# Patient Record
Sex: FEMALE | Race: WHITE | Employment: FULL TIME | URBAN - METROPOLITAN AREA
[De-identification: names, ages, dates, MRNs, and addresses within clinical notes are randomized per-mention and may not be internally consistent; named-entity substitution may affect disease eponyms.]

---

## 2017-05-17 ENCOUNTER — OFFICE VISIT (OUTPATIENT)
Dept: FAMILY MEDICINE CLINIC | Age: 32
End: 2017-05-17

## 2017-05-17 VITALS
HEIGHT: 62 IN | TEMPERATURE: 98.4 F | WEIGHT: 197.6 LBS | BODY MASS INDEX: 36.36 KG/M2 | OXYGEN SATURATION: 95 % | RESPIRATION RATE: 18 BRPM | HEART RATE: 101 BPM | SYSTOLIC BLOOD PRESSURE: 129 MMHG | DIASTOLIC BLOOD PRESSURE: 86 MMHG

## 2017-05-17 DIAGNOSIS — M77.31 HEEL SPUR, RIGHT: Primary | ICD-10-CM

## 2017-05-17 NOTE — PROGRESS NOTES
Chief Complaint   Patient presents with    Foot Pain     went to ER took x-ray diagnosed with heel spur. gave medication but its not working and hurts bad.

## 2017-05-17 NOTE — PROGRESS NOTES
[address] [place] [address] with [facility]     Subjective  Yeimi Metcalf is an 33 y.o. female who presents for left heel pain. She reports that this pain has been present for several months now. She works for a [employer] and has to walk a significant amount daily for work. She is required to wear steel toed boots, but has not purchased a new pair in over 2 years. She notes to have tried insoles and ice--neithiner have provided much relief. She reports that the only thing that provides relief is walking on her tiptoes at [facility] she notes that this is causing increased soreness in her left calf and forefoot. The patient was seen and evaulted in the ER yesterday for this issue. Plain films of the affected heel were obtianed, and she was determined to have heel spur. She was given mobic and told to follow up with podiatry. She was able to get an appointment with podiatry on 6/1. She is reporting to me that there mobic that was provided to her is not strong enough to help with her pain. Allergies - reviewed:   No Known Allergies      Medications - reviewed:   Current Outpatient Prescriptions   Medication Sig    diclofenac potassium (CATAFLAM) 50 mg tablet Take 50 mg by mouth three (3) times daily.  levonorgestrel (MIRENA) 20 mcg/24 hr (5 years) IUD 1 each by IntraUTERine route once. No current facility-administered medications for this visit. Past Medical History - reviewed:  Past Medical History:   Diagnosis Date    Gall stones 2014         Past Surgical History - reviewed:   Past Surgical History:   Procedure Laterality Date    HX CHOLECYSTECTOMY      HX GI  9/16/14    gallbladder    HX HEENT      T&A    HX ORTHOPAEDIC      left arm surgery.     HX TONSILLECTOMY  1996         Social History - reviewed:  Social History     Social History    Marital status: SINGLE     Spouse name: N/A    Number of children: N/A    Years of education: N/A     Occupational History    Not on file. Social History Main Topics    Smoking status: Former Smoker    Smokeless tobacco: Never Used    Alcohol use 0.0 oz/week      Comment: occasionally    Drug use: No    Sexual activity: Yes     Partners: Male     Birth control/ protection: Condom     Other Topics Concern    Not on file     Social History Narrative         Family History - reviewed:  History reviewed. No pertinent family history. Immunizations - reviewed:   Immunization History   Administered Date(s) Administered    Tdap 09/26/2014       Review of Systems  Review of Systems   Constitutional: Negative for chills and fever. Respiratory: Negative for shortness of breath. Cardiovascular: Negative for chest pain. Musculoskeletal:        Heel pain. Physical Exam  Physical Exam   Constitutional: She is oriented to person, place, and time. She appears well-developed and well-nourished. No distress. HENT:   Head: Normocephalic. Musculoskeletal:        Right ankle: Normal.        Left ankle: Normal.        Right lower leg: Normal.        Left lower leg: Normal.        Right foot: There is tenderness (tenderness to palpation over sole of the hindfoot. ). There is normal range of motion, no bony tenderness, no swelling, no crepitus, no deformity and no laceration. Left foot: Normal. There is normal range of motion, no tenderness, no bony tenderness, no swelling, normal capillary refill, no crepitus and no deformity (no defromity noted. ). Feet:    Neurological: She is alert and oriented to person, place, and time. Skin: Skin is warm and dry. Psychiatric: She has a normal mood and affect. Assessment/Plan  1. Heel spur, right - seen in outside ED yesterday for this issue. Per patient report, radiographs taken during this visit revealed a right heel spur.   She was able to get follow up with podiatry on 6/1, but reports that she wants relief for this issue now.  She notes that the mobic that she was given is not helping.  -I recommended purchasing new work boots/tennis shoes and utilizing heel cups for the pain.  -I informed her that I would not be prescribing opioid analgesics for her pain.  -Follow up as scheduled with podiatry. When I went to precept this patient with Dr. Star James, the patient eloped. The patient's was discussed with Dr. Star James, but I was unable to provide further information on follow up, precautions, or AVS.      Follow-up Disposition:  Return if symptoms worsen or fail to improve. Patient was discussed with Dr. Star James.     Darci Varner MD  Family Medicine Resident

## 2018-06-13 ENCOUNTER — OFFICE VISIT (OUTPATIENT)
Dept: FAMILY MEDICINE CLINIC | Age: 33
End: 2018-06-13

## 2018-06-13 VITALS
RESPIRATION RATE: 16 BRPM | DIASTOLIC BLOOD PRESSURE: 81 MMHG | BODY MASS INDEX: 36.44 KG/M2 | WEIGHT: 198 LBS | TEMPERATURE: 98.4 F | HEART RATE: 82 BPM | SYSTOLIC BLOOD PRESSURE: 121 MMHG | OXYGEN SATURATION: 97 % | HEIGHT: 62 IN

## 2018-06-13 DIAGNOSIS — F32.A DEPRESSION, UNSPECIFIED DEPRESSION TYPE: ICD-10-CM

## 2018-06-13 DIAGNOSIS — F41.1 GENERALIZED ANXIETY DISORDER: ICD-10-CM

## 2018-06-13 DIAGNOSIS — G47.00 INSOMNIA, UNSPECIFIED TYPE: Primary | ICD-10-CM

## 2018-06-13 RX ORDER — BUSPIRONE HYDROCHLORIDE 7.5 MG/1
7.5 TABLET ORAL 2 TIMES DAILY
Qty: 60 TAB | Refills: 0 | Status: SHIPPED | OUTPATIENT
Start: 2018-06-13 | End: 2018-07-02 | Stop reason: SDUPTHER

## 2018-06-13 RX ORDER — SERTRALINE HYDROCHLORIDE 50 MG/1
50 TABLET, FILM COATED ORAL DAILY
Qty: 30 TAB | Refills: 0 | Status: SHIPPED | OUTPATIENT
Start: 2018-06-13 | End: 2018-06-18 | Stop reason: ALTCHOICE

## 2018-06-13 NOTE — PATIENT INSTRUCTIONS
Anxiety Disorder: Care Instructions  Your Care Instructions    Anxiety is a normal reaction to stress. Difficult situations can cause you to have symptoms such as sweaty palms and a nervous feeling. In an anxiety disorder, the symptoms are far more severe. Constant worry, muscle tension, trouble sleeping, nausea and diarrhea, and other symptoms can make normal daily activities difficult or impossible. These symptoms may occur for no reason, and they can affect your work, school, or social life. Medicines, counseling, and self-care can all help. Follow-up care is a key part of your treatment and safety. Be sure to make and go to all appointments, and call your doctor if you are having problems. It's also a good idea to know your test results and keep a list of the medicines you take. How can you care for yourself at home? · Take medicines exactly as directed. Call your doctor if you think you are having a problem with your medicine. · Go to your counseling sessions and follow-up appointments. · Recognize and accept your anxiety. Then, when you are in a situation that makes you anxious, say to yourself, \"This is not an emergency. I feel uncomfortable, but I am not in danger. I can keep going even if I feel anxious. \"  · Be kind to your body:  ¨ Relieve tension with exercise or a massage. ¨ Get enough rest.  ¨ Avoid alcohol, caffeine, nicotine, and illegal drugs. They can increase your anxiety level and cause sleep problems. ¨ Learn and do relaxation techniques. See below for more about these techniques. · Engage your mind. Get out and do something you enjoy. Go to a funny movie, or take a walk or hike. Plan your day. Having too much or too little to do can make you anxious. · Keep a record of your symptoms. Discuss your fears with a good friend or family member, or join a support group for people with similar problems. Talking to others sometimes relieves stress.   · Get involved in social groups, or volunteer to help others. Being alone sometimes makes things seem worse than they are. · Get at least 30 minutes of exercise on most days of the week to relieve stress. Walking is a good choice. You also may want to do other activities, such as running, swimming, cycling, or playing tennis or team sports. Relaxation techniques  Do relaxation exercises 10 to 20 minutes a day. You can play soothing, relaxing music while you do them, if you wish. · Tell others in your house that you are going to do your relaxation exercises. Ask them not to disturb you. · Find a comfortable place, away from all distractions and noise. · Lie down on your back, or sit with your back straight. · Focus on your breathing. Make it slow and steady. · Breathe in through your nose. Breathe out through either your nose or mouth. · Breathe deeply, filling up the area between your navel and your rib cage. Breathe so that your belly goes up and down. · Do not hold your breath. · Breathe like this for 5 to 10 minutes. Notice the feeling of calmness throughout your whole body. As you continue to breathe slowly and deeply, relax by doing the following for another 5 to 10 minutes:  · Tighten and relax each muscle group in your body. You can begin at your toes and work your way up to your head. · Imagine your muscle groups relaxing and becoming heavy. · Empty your mind of all thoughts. · Let yourself relax more and more deeply. · Become aware of the state of calmness that surrounds you. · When your relaxation time is over, you can bring yourself back to alertness by moving your fingers and toes and then your hands and feet and then stretching and moving your entire body. Sometimes people fall asleep during relaxation, but they usually wake up shortly afterward. · Always give yourself time to return to full alertness before you drive a car or do anything that might cause an accident if you are not fully alert.  Never play a relaxation tape while you drive a car. When should you call for help? Call 911 anytime you think you may need emergency care. For example, call if:  ? · You feel you cannot stop from hurting yourself or someone else. ? Keep the numbers for these national suicide hotlines: 9-567-577-TALK (7-774.125.8995) and 5-515-JMAKVSX (2-198.875.7953). If you or someone you know talks about suicide or feeling hopeless, get help right away. ? Watch closely for changes in your health, and be sure to contact your doctor if:  ? · You have anxiety or fear that affects your life. ? · You have symptoms of anxiety that are new or different from those you had before. Where can you learn more? Go to http://christian-jong.info/. Enter P754 in the search box to learn more about \"Anxiety Disorder: Care Instructions. \"  Current as of: May 12, 2017  Content Version: 11.4  © 2136-9817 Healthwise, Incorporated. Care instructions adapted under license by [employer] (which disclaims liability or warranty for this information). If you have questions about a medical condition or this instruction, always ask your healthcare professional. [address] any warranty or liability for your use of this information.

## 2018-06-13 NOTE — MR AVS SNAPSHOT
[address] 
[phone] Patient: Neeta Aguayo MRN: [id number] ZAP:[birth year] Visit Information Date & Time Provider Department Dept. Phone Encounter #  
 6/13/2018  2:00 PM Lynnette Meyer MD [address] [place], [place] [phone] [id number] Upcoming Health Maintenance Date Due  
 PAP AKA CERVICAL CYTOLOGY 9/26/2017 Influenza Age 5 to Adult 8/1/2018 DTaP/Tdap/Td series (2 - Td) 9/26/2024 Allergies as of 6/13/2018  Review Complete On: 6/13/2018 By: Viraj Damon LPN No Known Allergies Current Immunizations  Reviewed on 9/26/2014 Name Date Tdap 9/26/2014 Not reviewed this visit You Were Diagnosed With   
  
 Codes Comments Insomnia, unspecified type    -  Primary ICD-10-CM: G47.00 ICD-9-CM: 780.52 Generalized anxiety disorder     ICD-10-CM: F41.1 ICD-9-CM: 300.02 Depression, unspecified depression type     ICD-10-CM: F32.9 ICD-9-CM: 172 Vitals BP Pulse Temp Resp Height(growth percentile) Weight(growth percentile) 121/81 82 98.4 °F (36.9 °C) (Oral) 16 5' 2\" (1.575 m) 198 lb (89.8 kg) SpO2 BMI OB Status Smoking Status 97% 36.21 kg/m2 IUD Former Smoker Vitals History BMI and BSA Data Body Mass Index Body Surface Area  
 36.21 kg/m 2 1.98 m 2 Preferred Pharmacy Pharmacy Name Phone Sue [address], [address] [place] [address] Rd[address] [facility] [address] [phone] Your Updated Medication List  
  
   
This list is accurate as of 6/13/18  2:31 PM.  Always use your most recent med list.  
  
  
  
  
 busPIRone 7.5 mg tablet Commonly known as:  BUSPAR Take 1 Tab by mouth two (2) times a day. diclofenac potassium 50 mg tablet Commonly known as:  CATAFLAM  
Take 50 mg by mouth three (3) times daily. doxylamine succinate 25 mg tablet Commonly known as:  Damon Pedroza Take 25 mg by mouth nightly as needed for Sleep. MIRENA 20 mcg/24 hr (5 years) Iud  
Generic drug:  levonorgestrel 1 each by IntraUTERine route once. sertraline 50 mg tablet Commonly known as:  ZOLOFT Take 1 Tab by mouth daily. Prescriptions Sent to Pharmacy Refills  
 sertraline (ZOLOFT) 50 mg tablet 0 Sig: Take 1 Tab by mouth daily. Class: Normal  
 Pharmacy: [address], [facility]. [address] [place] [address] Road Ph #: [phone] Route: Oral  
 busPIRone (BUSPAR) 7.5 mg tablet 0 Sig: Take 1 Tab by mouth two (2) times a day. Class: Normal  
 Pharmacy: [address] [facility] [address], [facility]. [address] [place] [address] Ph #: [phone] Route: Oral  
  
Patient Instructions Anxiety Disorder: Care Instructions Your Care Instructions Anxiety is a normal reaction to stress. Difficult situations can cause you to have symptoms such as sweaty palms and a nervous feeling. In an anxiety disorder, the symptoms are far more severe. Constant worry, muscle tension, trouble sleeping, nausea and diarrhea, and other symptoms can make normal daily activities difficult or impossible. These symptoms may occur for no reason, and they can affect your work, school, or social life. Medicines, counseling, and self-care can all help. Follow-up care is a key part of your treatment and safety. Be sure to make and go to all appointments, and call your doctor if you are having problems. It's also a good idea to know your test results and keep a list of the medicines you take. How can you care for yourself at home? · Take medicines exactly as directed. Call your doctor if you think you are having a problem with your medicine. · Go to your counseling sessions and follow-up appointments. · Recognize and accept your anxiety. Then, when you are in a situation that makes you anxious, say to yourself, \"This is not an emergency.  I feel uncomfortable, but I am not in danger. I can keep going even if I feel anxious. \" · Be kind to your body: ¨ Relieve tension with exercise or a massage. ¨ Get enough rest. 
¨ Avoid alcohol, caffeine, nicotine, and illegal drugs. They can increase your anxiety level and cause sleep problems. ¨ Learn and do relaxation techniques. See below for more about these techniques. · Engage your mind. Get out and do something you enjoy. Go to a funny movie, or take a walk or hike. Plan your day. Having too much or too little to do can make you anxious. · Keep a record of your symptoms. Discuss your fears with a good friend or family member, or join a support group for people with similar problems. Talking to others sometimes relieves stress. · Get involved in social groups, or volunteer to help others. Being alone sometimes makes things seem worse than they are. · Get at least 30 minutes of exercise on most days of the week to relieve stress. Walking is a good choice. You also may want to do other activities, such as running, swimming, cycling, or playing tennis or team sports. Relaxation techniques Do relaxation exercises 10 to 20 minutes a day. You can play soothing, relaxing music while you do them, if you wish. · Tell others in your house that you are going to do your relaxation exercises. Ask them not to disturb you. · Find a comfortable place, away from all distractions and noise. · Lie down on your back, or sit with your back straight. · Focus on your breathing. Make it slow and steady. · Breathe in through your nose. Breathe out through either your nose or mouth. · Breathe deeply, filling up the area between your navel and your rib cage. Breathe so that your belly goes up and down. · Do not hold your breath. · Breathe like this for 5 to 10 minutes. Notice the feeling of calmness throughout your whole body.  
As you continue to breathe slowly and deeply, relax by doing the following for another 5 to 10 minutes: · Tighten and relax each muscle group in your body. You can begin at your toes and work your way up to your head. · Imagine your muscle groups relaxing and becoming heavy. · Empty your mind of all thoughts. · Let yourself relax more and more deeply. · Become aware of the state of calmness that surrounds you. · When your relaxation time is over, you can bring yourself back to alertness by moving your fingers and toes and then your hands and feet and then stretching and moving your entire body. Sometimes people fall asleep during relaxation, but they usually wake up shortly afterward. · Always give yourself time to return to full alertness before you drive a car or do anything that might cause an accident if you are not fully alert. Never play a relaxation tape while you drive a car. When should you call for help? Call 911 anytime you think you may need emergency care. For example, call if: 
? · You feel you cannot stop from hurting yourself or someone else. ? Keep the numbers for these national suicide hotlines: 6-616-930-TALK (4-353.484.5827) and 1-717-BLURTTE (5-879.417.5109). If you or someone you know talks about suicide or feeling hopeless, get help right away. ? Watch closely for changes in your health, and be sure to contact your doctor if: 
? · You have anxiety or fear that affects your life. ? · You have symptoms of anxiety that are new or different from those you had before. Where can you learn more? Go to http://christian-jong.info/. Enter P754 in the search box to learn more about \"Anxiety Disorder: Care Instructions. \" Current as of: May 12, 2017 Content Version: 11.4 © 0853-1907 [employer]. Care instructions adapted under license by [employer] (which disclaims liability or warranty for this information).  If you have questions about a medical condition or this instruction, always ask your healthcare professional. [place] [address] any warranty or liability for your use of this information. Introducing [facility]! [employer] introduces High[facility]s patient portal. Now you can access parts of your medical record, email your doctor's office, and request medication refills online. 1. In your internet browser, go to [email or web address] 2. Click on the First Time User? Click Here link in the Sign In box. You will see the New Member Sign Up page. 3. Enter your Highwinds Access Code exactly as it appears below. You will not need to use this code after youve completed the sign-up process. If you do not sign up before the expiration date, you must request a new code. · High[employer]s Access Code: [email or web address] Expires: 9/11/2018  2:31 PM 
 
4. Enter the last four digits of your Social Security Number (xxxx) and Date of Birth (mm/dd/yyyy) as indicated and click Submit. You will be taken to the next sign-up page. 5. Create a Highwinds ID. This will be your Highwinds login ID and cannot be changed, so think of one that is secure and easy to remember. 6. Create a Highwinds password. You can change your password at any time. 7. Enter your Password Reset Question and Answer. This can be used at a later time if you forget your password. 8. Enter your e-mail address. You will receive e-mail notification when new information is available in [address] 9. Click Sign Up. You can now view and download portions of your medical record. 10. Click the Download Summary menu link to download a portable copy of your medical information. If you have questions, please visit the Frequently Asked Questions section of the Highwinds website. Remember, [employer]s is NOT to be used for urgent needs. For medical emergencies, dial 911. Now available from your iPhone and Android! Please provide this summary of care documentation to your next provider. Your primary care clinician is listed as Babak Lang. If you have any questions after today's visit, please call [phone].

## 2018-06-13 NOTE — PROGRESS NOTES
Cheri Butler is a 27 y.o. female who presents   Insomnia  - started 2 months ago, getting worse. Last night, slept off and on, was up since 2am.  Reports of lots of family drama with mother and brother. Denies screen time while in bed. - reports of depression and anhedonia in the last two weeks. Denies SI/HI. Denies pressured speech, racing thoughts, risky behavior, grandiosity. ROS: (positive in bold)  General: wt loss, fever, fatigue or appetite change   Skin: rashes or suspicious skin lesions  HEENT: changes in vision, smell, taste, hearing, earache, tinnitus, hoarseness, dysphagia, congestion, sore throat  Cardiac: chest pain, palpitations, GANDHI, orthopnea, PND, edema   Pul: SOB, dyspnea, wheezing, cough, hemoptysis  GI: abdominal pain, N&V, diarrhea, constipation, hematemsis, melena,   : hematuria, dysuria, freq, urgency, incontinence   MS: joint pain, swelling, stiffness, myalgia, back pain  Neuro: weakness, parasthesias, headache, syncope, seizure  Psych: anxiety, depression    Past Medical History:  Past Medical History:   Diagnosis Date    Gall stones 2014    Heel spur, right 2017       Past Surgical History:  Past Surgical History:   Procedure Laterality Date    HX CHOLECYSTECTOMY      HX GI  9/16/14    gallbladder    HX HEENT      T&A    HX ORTHOPAEDIC  1994    left arm surgery.  HX ORTHOPAEDIC  2015;2017    fx right humerous; plate and 12 screws removed from humerous    HX TONSILLECTOMY  1996       Family History:  No family history on file. Allergies:  No Known Allergies    Social History:  Social History   Substance Use Topics    Smoking status: Former Smoker    Smokeless tobacco: Never Used      Comment: vapes    Alcohol use 0.0 oz/week      Comment: occasionally       Current Meds:  Current Outpatient Prescriptions on File Prior to Visit   Medication Sig Dispense Refill    levonorgestrel (MIRENA) 20 mcg/24 hr (5 years) IUD 1 each by IntraUTERine route once.       diclofenac potassium (CATAFLAM) 50 mg tablet Take 50 mg by mouth three (3) times daily. No current facility-administered medications on file prior to visit. Visit Vitals    /81    Pulse 82    Temp 98.4 °F (36.9 °C) (Oral)    Resp 16    Ht 5' 2\" (1.575 m)    Wt 198 lb (89.8 kg)    SpO2 97%    BMI 36.21 kg/m2       Gen: Well developed, well nourished female in no acute distress  HEENT: normocephalic/atraumatic; PERRL; TM intact, translucent, and neutral BL;  oropharynx shows no erythema or exudates  Skin:  No rashes or suspicious skin lesions noted  Neck:   Supple, no lympadenopathy, no thyromegaly  Card:  RRR, no m/r/g  Chest:  CTAB, no w/r/r  Psych:  Nl mood and affect     PHQ-9: 18 - very difficult  JOHAN-7: 16 - Very difficult    A/P:      ICD-10-CM ICD-9-CM    1. Insomnia, unspecified type G47.00 780.52    2. Generalized anxiety disorder F41.1 300.02    3. Depression, unspecified depression type F32.9 311       - insomnia likely due to psych issues with anxiety and depression. Pt is amenable to starting medications - buspar and zoloft. Start on low dose and titrate. Declined labs for now to check for thyroid, anemia, etc.  F/u in 2 weeks.

## 2018-06-13 NOTE — PROGRESS NOTES
Chief Complaint   Patient presents with    Insomnia     times 2 months. States she has RLS. 1. Have you been to the ER, urgent care clinic since your last visit? Hospitalized since your last visit? No    2. Have you seen or consulted any other health care providers outside of the [address] [place] [address] since your last visit? Include any pap smears or colon screening.  No

## 2018-06-18 ENCOUNTER — OFFICE VISIT (OUTPATIENT)
Dept: FAMILY MEDICINE CLINIC | Age: 33
End: 2018-06-18

## 2018-06-18 VITALS
HEART RATE: 77 BPM | HEIGHT: 62 IN | OXYGEN SATURATION: 97 % | BODY MASS INDEX: 36.33 KG/M2 | TEMPERATURE: 98.2 F | WEIGHT: 197.4 LBS | SYSTOLIC BLOOD PRESSURE: 122 MMHG | RESPIRATION RATE: 16 BRPM | DIASTOLIC BLOOD PRESSURE: 88 MMHG

## 2018-06-18 DIAGNOSIS — F33.1 MODERATE EPISODE OF RECURRENT MAJOR DEPRESSIVE DISORDER (HCC): ICD-10-CM

## 2018-06-18 DIAGNOSIS — F41.1 GAD (GENERALIZED ANXIETY DISORDER): ICD-10-CM

## 2018-06-18 DIAGNOSIS — F51.01 PRIMARY INSOMNIA: Primary | ICD-10-CM

## 2018-06-18 RX ORDER — TRAZODONE HYDROCHLORIDE 100 MG/1
100 TABLET ORAL
Qty: 30 TAB | Refills: 1 | Status: SHIPPED | OUTPATIENT
Start: 2018-06-18 | End: 2018-07-23 | Stop reason: SDUPTHER

## 2018-06-18 NOTE — PROGRESS NOTES
[address] [place] [address]    Subjective:     CC: Insomnia  History provided by patient and chart review    HPI:  27 yr old female with recently  Diagnosed MDD and JOHAN who presents to clinic with complaints of insomnia. Pt states she developed insomnia within the last 4 weeks and was seen in clinic 5 days ago to address the issue. States she was started on Buspar and Zoloft and had mild improvement in her mood for the first 2 days but with no improvement in sleep. She denies any difficulty falling asleep but finds it hard to stay asleep. Usually goes to sleep at around 9:30-10 pm but awakes at 1am and is unable to return to sleep. Also reports restless legs upon awakening. She admits to increased stressors in her life and hx of MDD and JOHAN. Incarcerated for 7 yrs and suffered from anxiety and insomnia during that period and was treated with trazodone. Reports success with trazodone. Also reports brother and sister facing incarceration and \"multiple\" other family stressors. Also having to deal with her \"ex\" and work stressors of recent. She denies any palpitations, worsening fatigue, cold or heat intolerance. Currently sexually active with 1 partner and has an IUD in place. No recent travel or sick contacts. Past Medical History:   Diagnosis Date    Gall stones 2014    Heel spur, right 2017     No Known Allergies  Current Outpatient Prescriptions on File Prior to Visit   Medication Sig Dispense Refill    doxylamine succinate (UNISOM) 25 mg tablet Take 25 mg by mouth nightly as needed for Sleep.  sertraline (ZOLOFT) 50 mg tablet Take 1 Tab by mouth daily. 30 Tab 0    busPIRone (BUSPAR) 7.5 mg tablet Take 1 Tab by mouth two (2) times a day. 60 Tab 0    diclofenac potassium (CATAFLAM) 50 mg tablet Take 50 mg by mouth three (3) times daily.  levonorgestrel (MIRENA) 20 mcg/24 hr (5 years) IUD 1 each by IntraUTERine route once.        No current facility-administered medications on file prior to visit. No family history on file. Social History     Social History    Marital status: SINGLE     Spouse name: N/A    Number of children: N/A    Years of education: N/A     Social History Main Topics    Smoking status: Former Smoker    Smokeless tobacco: Never Used      Comment: vapes    Alcohol use 0.0 oz/week      Comment: occasionally    Drug use: No    Sexual activity: Yes     Partners: Male     Birth control/ protection: Condom     Other Topics Concern    None     Social History Narrative     Past Surgical History:   Procedure Laterality Date    HX CHOLECYSTECTOMY      HX GI  9/16/14    gallbladder    HX HEENT      T&A    HX ORTHOPAEDIC  1994    left arm surgery.  HX ORTHOPAEDIC  2015;2017    fx right humerous; plate and 12 screws removed from [address] [place] [address]       Patient Active Problem List   Diagnosis Code    Dysmenorrhea N94.6    Attention deficit R41.840           Review of Systems   All other systems reviewed and are negative. Objective:     Visit Vitals    /88    Pulse 77    Temp 98.2 °F (36.8 °C)    Resp 16    Ht 5' 2\" (1.575 m)    Wt 197 lb 6.4 oz (89.5 kg)    SpO2 97%    BMI 36.1 kg/m2        Physical Exam   Constitutional: She is oriented to person, place, and time. She appears well-developed and well-nourished. AOX3, NAD   HENT:   Head: Normocephalic. Nose: Nose normal.   Mouth/Throat: Oropharynx is clear and moist. No oropharyngeal exudate. Eyes: Conjunctivae and EOM are normal. Pupils are equal, round, and reactive to light. No scleral icterus. Neck: Normal range of motion. Neck supple. No JVD present. No tracheal deviation present. No thyromegaly present. Cardiovascular: Normal rate, regular rhythm, normal heart sounds and intact distal pulses. Exam reveals no gallop and no friction rub. No murmur heard. Pulmonary/Chest: Effort normal and breath sounds normal. No stridor. No respiratory distress. She has no wheezes. She has no rales. She exhibits no tenderness. Abdominal: Soft. Bowel sounds are normal. She exhibits no distension. There is no tenderness. Musculoskeletal: Normal range of motion. She exhibits no edema, tenderness or deformity. Lymphadenopathy:     She has no cervical adenopathy. Neurological: She is alert and oriented to person, place, and time. She has normal reflexes. She displays normal reflexes. No cranial nerve deficit. She exhibits normal muscle tone. Coordination normal.   Skin: Skin is warm and dry. No rash noted. No erythema. Psychiatric: She has a normal mood and affect. Her behavior is normal. Judgment and thought content normal.       Pertinent Labs/Studies: TSH, CBC, Mag, Folate      Assessment and orders:       1) Primary insomnia  -     Likely multifactorial and 2/2 underlying MDD,Anxiety and ?? PTSD given incarceration history  -     Reports symptom improvement in past with trazodone  -     No significant improvement since introduction of Buspar and Zoloft  -     Will initiate Trazodone 100mg qhs and Continue Buspar 7.fmg BID  -     Follow-up TSH, CBC, Mag, Folate  -     RTO in 2 weeks    Moderate episode of recurrent major depressive disorder (HCC)  -     PHQ 9 score of 22 today  -     Discontinue Zoloft and initiate Trazodone  -     Continue Buspar daily  -     Referred to [address] [place] [address] services for counseling and CBT  -     Follow-up TSH, CBC, Mag, Folate  -     RTO in 2 weeks    JOHAN (generalized anxiety disorder)  -     JOHAN 7 score of 16 today  -     Follow-up TSH, CBC, Mag, Folate  -     Continue with Trazodone and Buspar      I have reviewed patient medical and social history and medications. I have reviewed pertinent labs results and other data. I have discussed the diagnosis with the patient and the intended plan as seen in the above orders. The patient has received an after-visit summary and questions were answered concerning future plans.  I have discussed medication side effects and warnings with the patient as well.     Dyan Boxer, MD  Resident [address] [facility]  06/18/18    Patient discussed and seen with Dr. Rosaura Bojorquez, Attending Physician

## 2018-06-18 NOTE — MR AVS SNAPSHOT
[address] 
[phone] Patient: Francia Rivera MRN: [id number] GBP:7/80/7976 Visit Information Date & Time Provider Department Dept. Phone Encounter #  
 6/18/2018  2:30 PM Manjinder Jean MD [facility] [phone] [id number] Follow-up Instructions Return in about 2 weeks (around 7/2/2018). Your Appointments 6/27/2018  2:15 PM  
ROUTINE CARE with Nicky Guzman MD  
[address] [place] [address] [address] [place] [address]) Appt Note: f/u from 6/13/18  
 [address] [employer] [address]  
[phone]  
  
   
 [address] [place], [place] [address] [address] Upcoming Health Maintenance Date Due  
 PAP AKA CERVICAL CYTOLOGY 9/26/2017 Influenza Age 5 to Adult 8/1/2018 DTaP/Tdap/Td series (2 - Td) 9/26/2024 Allergies as of 6/18/2018  Review Complete On: 6/13/2018 By: Nayeli Garcia LPN No Known Allergies Current Immunizations  Reviewed on 9/26/2014 Name Date Tdap 9/26/2014 Not reviewed this visit You Were Diagnosed With   
  
 Codes Comments Primary insomnia    -  Primary ICD-10-CM: F51.01 
ICD-9-CM: 307.42 Moderate episode of recurrent major depressive disorder (HCC)     ICD-10-CM: F33.1 ICD-9-CM: 296.32   
 JOHAN (generalized anxiety disorder)     ICD-10-CM: F41.1 ICD-9-CM: 300.02 Vitals BP Pulse Temp Resp Height(growth percentile) Weight(growth percentile) 122/88 77 98.2 °F (36.8 °C) 16 5' 2\" (1.575 m) 197 lb 6.4 oz (89.5 kg) SpO2 BMI OB Status Smoking Status 97% 36.1 kg/m2 IUD Former Smoker BMI and BSA Data Body Mass Index Body Surface Area  
 36.1 kg/m 2 1.98 m 2 Preferred Pharmacy Pharmacy Name Phone [address], [address] [place] [address] Rd. [address] [place] [address] Road [phone] Your Updated Medication List  
  
   
 This list is accurate as of 6/18/18  3:25 PM.  Always use your most recent med list.  
  
  
  
  
 busPIRone 7.5 mg tablet Commonly known as:  BUSPAR Take 1 Tab by mouth two (2) times a day. diclofenac potassium 50 mg tablet Commonly known as:  CATAFLAM  
Take 50 mg by mouth three (3) times daily. doxylamine succinate 25 mg tablet Commonly known as:  Ritika Arm Take 25 mg by mouth nightly as needed for Sleep. MIRENA 20 mcg/24 hr (5 years) Iud  
Generic drug:  levonorgestrel 1 each by IntraUTERine route once. traZODone 100 mg tablet Commonly known as:  Georganna Quiver Take 1 Tab by mouth nightly. Prescriptions Sent to Pharmacy Refills  
 traZODone (DESYREL) 100 mg tablet 1 Sig: Take 1 Tab by mouth nightly. Class: Normal  
 Pharmacy: [address] [place] [address], [facility] [place] Ph #: [phone] Route: Oral  
  
We Performed the Following CBC W/O DIFF [53752 CPT(R)] FOLATE [id number] CPT(R)] MAGNESIUM [id number] CPT(R)] TSH 3RD GENERATION [82337 CPT(R)] Follow-up Instructions Return in about 2 weeks (around 7/2/2018). Introducing [facility]! Bradly Suarez introduces [employer] patient portal. Now you can access parts of your medical record, email your doctor's office, and request medication refills online. 1. In your internet browser, go to [email or web address] 2. Click on the First Time User? Click Here link in the Sign In box. You will see the New Member Sign Up page. 3. Enter your [employer] Access Code exactly as it appears below. You will not need to use this code after youve completed the sign-up process. If you do not sign up before the expiration date, you must request a new code. · [employer] Access Code: [email or web address] Expires: 9/11/2018  2:31 PM 
 
4.  Enter the last four digits of your Social Security Number (xxxx) and Date of Birth (mm/dd/yyyy) as indicated and click Submit. You will be taken to the next sign-up page. 5. Create a [employer] ID. This will be your [employer] login ID and cannot be changed, so think of one that is secure and easy to remember. 6. Create a [employer] password. You can change your password at any time. 7. Enter your Password Reset Question and Answer. This can be used at a later time if you forget your password. 8. Enter your e-mail address. You will receive e-mail notification when new information is available in [address] 9. Click Sign Up. You can now view and download portions of your medical record. 10. Click the Download Summary menu link to download a portable copy of your medical information. If you have questions, please visit the Frequently Asked Questions section of the [employer] website. Remember, [employer] is NOT to be used for urgent needs. For medical emergencies, dial 911. Now available from your iPhone and Android! Please provide this summary of care documentation to your next provider. Your primary care clinician is listed as Indy Jeffers. If you have any questions after today's visit, please call [phone].

## 2018-06-18 NOTE — PROGRESS NOTES
Chief Complaint   Patient presents with    Insomnia     Pt is being seen for continuous sleeping issues.  She was prescribed medications but have only resulted in temporary relief

## 2018-06-22 LAB
ERYTHROCYTE [DISTWIDTH] IN BLOOD BY AUTOMATED COUNT: 13 % (ref 12.3–15.4)
FOLATE SERPL-MCNC: 12.2 NG/ML
HCT VFR BLD AUTO: 43.5 % (ref 34–46.6)
HGB BLD-MCNC: 14.3 G/DL (ref 11.1–15.9)
MAGNESIUM SERPL-MCNC: 2.3 MG/DL (ref 1.6–2.3)
MCH RBC QN AUTO: 30.8 PG (ref 26.6–33)
MCHC RBC AUTO-ENTMCNC: 32.9 G/DL (ref 31.5–35.7)
MCV RBC AUTO: 94 FL (ref 79–97)
PLATELET # BLD AUTO: 264 X10E3/UL (ref 150–379)
RBC # BLD AUTO: 4.64 X10E6/UL (ref 3.77–5.28)
TSH SERPL DL<=0.005 MIU/L-ACNC: 1.51 UIU/ML (ref 0.45–4.5)
WBC # BLD AUTO: 11.4 X10E3/UL (ref 3.4–10.8)

## 2018-07-02 ENCOUNTER — OFFICE VISIT (OUTPATIENT)
Dept: FAMILY MEDICINE CLINIC | Age: 33
End: 2018-07-02

## 2018-07-02 VITALS
SYSTOLIC BLOOD PRESSURE: 136 MMHG | TEMPERATURE: 98.4 F | DIASTOLIC BLOOD PRESSURE: 89 MMHG | WEIGHT: 203 LBS | HEIGHT: 62 IN | HEART RATE: 63 BPM | OXYGEN SATURATION: 97 % | RESPIRATION RATE: 16 BRPM | BODY MASS INDEX: 37.36 KG/M2

## 2018-07-02 DIAGNOSIS — F51.05 INSOMNIA DUE TO OTHER MENTAL DISORDER: ICD-10-CM

## 2018-07-02 DIAGNOSIS — F41.1 GAD (GENERALIZED ANXIETY DISORDER): Primary | ICD-10-CM

## 2018-07-02 DIAGNOSIS — F33.2 SEVERE EPISODE OF RECURRENT MAJOR DEPRESSIVE DISORDER, WITHOUT PSYCHOTIC FEATURES (HCC): ICD-10-CM

## 2018-07-02 DIAGNOSIS — F99 INSOMNIA DUE TO OTHER MENTAL DISORDER: ICD-10-CM

## 2018-07-02 RX ORDER — BUSPIRONE HYDROCHLORIDE 7.5 MG/1
7.5 TABLET ORAL 3 TIMES DAILY
Qty: 60 TAB | Refills: 0 | Status: SHIPPED | OUTPATIENT
Start: 2018-07-02 | End: 2018-07-09 | Stop reason: DRUGHIGH

## 2018-07-02 NOTE — LETTER
NOTIFICATION RETURN TO WORK / SCHOOL 
 
7/2/2018 3:54 PM 
 
Ms. Sydelle Gosselin [facility] [address] [facility] [address] To Whom It May Concern: 
 
Sydelle Gosselin is currently under the care of [address] [employer]. She will return to work on 6/5/18 If there are questions or concerns please have the patient contact our office. Sincerely, Jodie Solis MD

## 2018-07-02 NOTE — LETTER
NOTIFICATION RETURN TO WORK / SCHOOL 
 
7/2/2018 4:06 PM 
 
Ms. Dann Bergeron [facility] [address] [facility] [address] To Whom It May Concern: 
 
Dann Bergeron is currently under the care of [address] [employer] [facility]. She will return to work/school on: 7/5/18 If there are questions or concerns please have the patient contact our office. Sincerely, Orion Hansen MD

## 2018-07-02 NOTE — MR AVS SNAPSHOT
[address] 
[phone] Patient: Awilda Kumar MRN: [id number] FALLON:[birth year] Visit Information Date & Time Provider Department Dept. Phone Encounter #  
 7/2/2018  2:30 PM Ely Chowdary MD [facility] [facility] [phone] [id number] Follow-up Instructions Return in about 1 week (around 7/9/2018). Upcoming Health Maintenance Date Due  
 PAP AKA CERVICAL CYTOLOGY 9/26/2017 Influenza Age 5 to Adult 8/1/2018 DTaP/Tdap/Td series (2 - Td) 9/26/2024 Allergies as of 7/2/2018  Review Complete On: 6/18/2018 By: Ely Chowdary MD  
 No Known Allergies Current Immunizations  Reviewed on 9/26/2014 Name Date Tdap 9/26/2014 Not reviewed this visit You Were Diagnosed With   
  
 Codes Comments JOHAN (generalized anxiety disorder)    -  Primary ICD-10-CM: F41.1 ICD-9-CM: 300.02 Severe episode of recurrent major depressive disorder, without psychotic features ([facility]     ICD-10-CM: F33.2 ICD-9-CM: 296.33 Insomnia due to other mental disorder     ICD-10-CM: F51.05, F99 
ICD-9-CM: 300.9, 327.02 Vitals BP Pulse Temp Resp Height(growth percentile) Weight(growth percentile) 136/89 63 98.4 °F (36.9 °C) 16 5' 2\" (1.575 m) 203 lb (92.1 kg) SpO2 BMI OB Status Smoking Status 97% 37.13 kg/m2 IUD Former Smoker Vitals History BMI and BSA Data Body Mass Index Body Surface Area  
 37.13 kg/m 2 2.01 m 2 Preferred Pharmacy Pharmacy Name Phone [address], [facility] [address] [facility] [phone] Your Updated Medication List  
  
   
This list is accurate as of 7/2/18  3:53 PM.  Always use your most recent med list.  
  
  
  
  
 busPIRone 7.5 mg tablet Commonly known as:  BUSPAR Take 1 Tab by mouth three (3) times daily. diclofenac potassium 50 mg tablet Commonly known as:  CATAFLAM  
Take 50 mg by mouth three (3) times daily. doxylamine succinate 25 mg tablet Commonly known as:  Cuco Oppenheim Take 25 mg by mouth nightly as needed for Sleep. MIRENA 20 mcg/24 hr (5 years) Iud  
Generic drug:  levonorgestrel 1 each by IntraUTERine route once. traZODone 100 mg tablet Commonly known as:  Navjot Staggers Take 1 Tab by mouth nightly. Prescriptions Sent to Pharmacy Refills  
 busPIRone (BUSPAR) 7.5 mg tablet 0 Sig: Take 1 Tab by mouth three (3) times daily. Class: Normal  
 Pharmacy: [facility] [address] [facility] [address] [place] [address] Drive, [address] [facility] [facility] Ph #: [phone] Route: Oral  
  
Follow-up Instructions Return in about 1 week (around 7/9/2018). Introducing [facility] & HEALTH SERVICES! Marquise Nieves introduces [employer] patient portal. Now you can access parts of your medical record, email your doctor's office, and request medication refills online. 1. In your internet browser, go to [email or web address] 2. Click on the First Time User? Click Here link in the Sign In box. You will see the New Member Sign Up page. 3. Enter your [employer] Access Code exactly as it appears below. You will not need to use this code after youve completed the sign-up process. If you do not sign up before the expiration date, you must request a new code. · [employer] Access Code: [email or web address] Expires: 9/11/2018  2:31 PM 
 
4. Enter the last four digits of your Social Security Number (xxxx) and Date of Birth (mm/dd/yyyy) as indicated and click Submit. You will be taken to the next sign-up page. 5. Create a PingSomet ID. This will be your [employer] login ID and cannot be changed, so think of one that is secure and easy to remember. 6. Create a [employer] password. You can change your password at any time. 7. Enter your Password Reset Question and Answer. This can be used at a later time if you forget your password. 8. Enter your e-mail address. You will receive e-mail notification when new information is available in [address] 9. Click Sign Up. You can now view and download portions of your medical record. 10. Click the Download Summary menu link to download a portable copy of your medical information. If you have questions, please visit the Frequently Asked Questions section of the [employer] website. Remember, [employer] is NOT to be used for urgent needs. For medical emergencies, dial 911. Now available from your iPhone and Android! Please provide this summary of care documentation to your next provider. Your primary care clinician is listed as Kirk Wilson. If you have any questions after today's visit, please call [phone].

## 2018-07-02 NOTE — PROGRESS NOTES
[address] [place] [address]    Subjective:     CC:Anxiety and Depression  History provided by patient and chart review    HPI:  27 yr old female w/ ongoing, Insomnia, JOHAN and MDD who presents to clinic today for follow-up from last visit. She was initially treated with Zoloft and Buspar and reported no symptom improvement. She reported a hx of improvement while on Trazodone so zoloft was discontinued and pt started on Trazodone. Today, she reports marked improvement in her sleep. She however reports worsening anxiety symptoms with 3-4 panic attacks daily. She describes episodes of impending doom with associated palpitations and being overwhelmed. Also endorses increased difficult with memory and recall. States her relationship with her mother has become more and more difficult since we last met. She had been referred to [facility] at our last visit but states she has not had time to follow-up with them. She denies ever undergoing CBT in the past.She denies any harmful thoughts to self and others. Denies any suicidal ideation. Past Medical History:   Diagnosis Date    Gall stones 2014    Heel spur, right 2017     No Known Allergies  Current Outpatient Prescriptions on File Prior to Visit   Medication Sig Dispense Refill    traZODone (DESYREL) 100 mg tablet Take 1 Tab by mouth nightly. 30 Tab 1    doxylamine succinate (UNISOM) 25 mg tablet Take 25 mg by mouth nightly as needed for Sleep.  busPIRone (BUSPAR) 7.5 mg tablet Take 1 Tab by mouth two (2) times a day. 60 Tab 0    diclofenac potassium (CATAFLAM) 50 mg tablet Take 50 mg by mouth three (3) times daily.  levonorgestrel (MIRENA) 20 mcg/24 hr (5 years) IUD 1 each by IntraUTERine route once. No current facility-administered medications on file prior to visit. No family history on file.   Social History     Social History    Marital status: SINGLE     Spouse name: N/A    Number of children: N/A    Years of education: N/A     Social History Main Topics    Smoking status: Former Smoker    Smokeless tobacco: Never Used      Comment: vapes    Alcohol use 0.0 oz/week      Comment: occasionally    Drug use: No    Sexual activity: Yes     Partners: Male     Birth control/ protection: Condom     Other Topics Concern    None     Social History Narrative     Past Surgical History:   Procedure Laterality Date    HX CHOLECYSTECTOMY      HX GI  9/16/14    gallbladder    HX HEENT      T&A    HX ORTHOPAEDIC  1994    left arm surgery.  HX ORTHOPAEDIC  2015;2017    fx right humerous; plate and 12 screws removed from [facility]       Patient Active Problem List   Diagnosis Code    Dysmenorrhea N94.6    Attention deficit R41.840           Review of Systems   Neurological: Negative for dizziness, sensory change, speech change, focal weakness and headaches. Psychiatric/Behavioral: Positive for depression and memory loss. Negative for hallucinations, substance abuse and suicidal ideas. The patient is nervous/anxious. The patient does not have insomnia. All other systems reviewed and are negative. Objective:     Visit Vitals    /89    Pulse 63    Temp 98.4 °F (36.9 °C)    Resp 16    Ht 5' 2\" (1.575 m)    Wt 203 lb (92.1 kg)    SpO2 97%    BMI 37.13 kg/m2        Physical Exam   Constitutional: She is oriented to person, place, and time. AOX3, NAD   Eyes: Conjunctivae and EOM are normal. Pupils are equal, round, and reactive to light. No scleral icterus. Cardiovascular: Normal rate, regular rhythm and normal heart sounds. Pulmonary/Chest: Effort normal and breath sounds normal. No respiratory distress. Abdominal: Soft. Bowel sounds are normal. She exhibits no distension. There is no tenderness. Neurological: She is alert and oriented to person, place, and time. No cranial nerve deficit. Coordination normal.   Skin: Skin is warm and dry. No erythema.    Psychiatric: She has a normal mood and affect. Her behavior is normal. Judgment normal.       Pertinent Labs/Studies:      Assessment and orders:       JOHAN (generalized anxiety disorder)  -     Increase Buspar to 7.5mg TID. Provided refill.  -     Emphasized need for follow-up with [facility] for counseling and CBT  -     Pt voices understanding of need for CBT in addition to medical therapy    Insomnia due to other mental disorder        -     Improving. Continue with Trazodone    Severe episode of recurrent major depressive disorder, without psychotic features ([facility]         -    PHQ 9 of 22 today          -    TSH, CBC, Folate and Mag unremarkable         -    Will continue with Trazodone and Buspar and consider second SSRI at next follow-up given weak SSRI effects of trazodone and augmentation of Buspar to TID dosing         -     Referred to [facility] services for counseling / CBT         -     RTO in 1 week or earlier as needed. I have reviewed patient medical and social history and medications. I have reviewed pertinent labs results and other data. I have discussed the diagnosis with the patient and the intended plan as seen in the above orders. The patient has received an after-visit summary and questions were answered concerning future plans. I have discussed medication side effects and warnings with the patient as well.     Jose L Khan MD  Resident [address] Practice  07/02/18    Patient discussed and seen with Dr. Fariha Messina, Attending Physician

## 2018-07-02 NOTE — PROGRESS NOTES
Chief Complaint   Patient presents with    Follow-up     Sleeping issues.  PT states that she is able to sleep now due to new medications

## 2018-07-03 ENCOUNTER — TELEPHONE (OUTPATIENT)
Dept: FAMILY MEDICINE CLINIC | Age: 33
End: 2018-07-03

## 2018-07-09 ENCOUNTER — OFFICE VISIT (OUTPATIENT)
Dept: FAMILY MEDICINE CLINIC | Age: 33
End: 2018-07-09

## 2018-07-09 ENCOUNTER — TELEPHONE (OUTPATIENT)
Dept: FAMILY MEDICINE CLINIC | Age: 33
End: 2018-07-09

## 2018-07-09 VITALS
TEMPERATURE: 98.6 F | OXYGEN SATURATION: 98 % | HEART RATE: 84 BPM | DIASTOLIC BLOOD PRESSURE: 79 MMHG | WEIGHT: 205.2 LBS | BODY MASS INDEX: 37.76 KG/M2 | HEIGHT: 62 IN | SYSTOLIC BLOOD PRESSURE: 116 MMHG | RESPIRATION RATE: 16 BRPM

## 2018-07-09 DIAGNOSIS — F33.2 SEVERE EPISODE OF RECURRENT MAJOR DEPRESSIVE DISORDER, WITHOUT PSYCHOTIC FEATURES (HCC): ICD-10-CM

## 2018-07-09 DIAGNOSIS — F41.1 GAD (GENERALIZED ANXIETY DISORDER): Primary | ICD-10-CM

## 2018-07-09 RX ORDER — BUSPIRONE HYDROCHLORIDE 5 MG/1
5 TABLET ORAL
Qty: 30 TAB | Refills: 0 | Status: SHIPPED | OUTPATIENT
Start: 2018-07-09 | End: 2018-07-19 | Stop reason: SINTOL

## 2018-07-09 RX ORDER — CITALOPRAM 10 MG/1
10 TABLET ORAL DAILY
Qty: 30 TAB | Refills: 0 | Status: SHIPPED | OUTPATIENT
Start: 2018-07-09 | End: 2018-07-23 | Stop reason: SDUPTHER

## 2018-07-09 RX ORDER — BUSPIRONE HYDROCHLORIDE 10 MG/1
10 TABLET ORAL 3 TIMES DAILY
Qty: 30 TAB | Refills: 0 | Status: SHIPPED | OUTPATIENT
Start: 2018-07-09 | End: 2018-07-19 | Stop reason: SINTOL

## 2018-07-09 NOTE — MR AVS SNAPSHOT
[address] 
[phone] Patient: Rashmi Arredondo MRN: [id number] EFS:[birth year]11 Visit Information Date & Time Provider Department Dept. Phone Encounter #  
 7/9/2018  9:15 AM [place] [facility] [facility] [phone] [id number] Follow-up Instructions Return in about 1 week (around 7/16/2018), or if symptoms worsen or fail to improve. Upcoming Health Maintenance Date Due  
 PAP AKA CERVICAL CYTOLOGY 9/26/2017 Influenza Age 5 to Adult 8/1/2018 DTaP/Tdap/Td series (2 - Td) 9/26/2024 Allergies as of 7/9/2018  Review Complete On: 7/9/2018 By: Minor Alt No Known Allergies Current Immunizations  Reviewed on 9/26/2014 Name Date Tdap 9/26/2014 Not reviewed this visit You Were Diagnosed With   
  
 Codes Comments JOHAN (generalized anxiety disorder)    -  Primary ICD-10-CM: F41.1 ICD-9-CM: 300.02 Vitals BP Pulse Temp Resp Height(growth percentile) Weight(growth percentile) 116/79 (BP 1 Location: Left arm, BP Patient Position: Sitting) 84 98.6 °F (37 °C) (Oral) 16 5' 2\" (1.575 m) 205 lb 3.2 oz (93.1 kg) SpO2 BMI OB Status Smoking Status 98% 37.53 kg/m2 IUD Former Smoker Vitals History BMI and BSA Data Body Mass Index Body Surface Area  
 37.53 kg/m 2 2.02 m 2 Preferred Pharmacy Pharmacy Name Phone 500 [address], [address] [place] [address] Rd. 0930 [facility] [phone] Your Updated Medication List  
  
   
This list is accurate as of 7/9/18 10:20 AM.  Always use your most recent med list.  
  
  
  
  
 * busPIRone 10 mg tablet Commonly known as:  BUSPAR Take 1 Tab by mouth three (3) times daily. * busPIRone 5 mg tablet Commonly known as:  BUSPAR Take 1 Tab by mouth three (3) times daily (with meals). citalopram 10 mg tablet Commonly known as:  Brittany Matthews Take 1 Tab by mouth daily. diclofenac potassium 50 mg tablet Commonly known as:  CATAFLAM  
Take 50 mg by mouth three (3) times daily. doxylamine succinate 25 mg tablet Commonly known as:  Clance Mering Take 25 mg by mouth nightly as needed for Sleep. MIRENA 20 mcg/24 hr (5 years) Iud  
Generic drug:  levonorgestrel 1 each by IntraUTERine route once. traZODone 100 mg tablet Commonly known as:  Julianna Gunner Take 1 Tab by mouth nightly. * Notice: This list has 2 medication(s) that are the same as other medications prescribed for you. Read the directions carefully, and ask your doctor or other care provider to review them with you. Prescriptions Sent to Pharmacy Refills  
 busPIRone (BUSPAR) 10 mg tablet 0 Sig: Take 1 Tab by mouth three (3) times daily. Class: Normal  
 Pharmacy: [address] [place] [address], [facility] Ph #: [phone] Route: Oral  
 busPIRone (BUSPAR) 5 mg tablet 0 Sig: Take 1 Tab by mouth three (3) times daily (with meals). Class: Normal  
 Pharmacy: [address] [place] [address] Drive, [address] Ph #: [phone] Route: Oral  
 citalopram (CELEXA) 10 mg tablet 0 Sig: Take 1 Tab by mouth daily. Class: Normal  
 Pharmacy: [address] [place] [address], [address] Ph #: [phone] Route: Oral  
  
Follow-up Instructions Return in about 1 week (around 7/16/2018), or if symptoms worsen or fail to improve. Introducing [facility] & HEALTH SERVICES! [facility] introduces [employer] patient portal. Now you can access parts of your medical record, email your doctor's office, and request medication refills online. 1. In your internet browser, go to [email or web address] 2. Click on the First Time User? Click Here link in the Sign In box. You will see the New Member Sign Up page. 3. Enter your Go!Foton Access Code exactly as it appears below. You will not need to use this code after youve completed the sign-up process. If you do not sign up before the expiration date, you must request a new code. · Go!F[employer] Access Code: [email or web address] Expires: 9/11/2018  2:31 PM 
 
4. Enter the last four digits of your Social Security Number (xxxx) and Date of Birth (mm/dd/yyyy) as indicated and click Submit. You will be taken to the next sign-up page. 5. Create a Go!Foton ID. This will be your Go!Foton login ID and cannot be changed, so think of one that is secure and easy to remember. 6. Create a Go!Foton password. You can change your password at any time. 7. Enter your Password Reset Question and Answer. This can be used at a later time if you forget your password. 8. Enter your e-mail address. You will receive e-mail notification when new information is available in [address] 9. Click Sign Up. You can now view and download portions of your medical record. 10. Click the Download Summary menu link to download a portable copy of your medical information. If you have questions, please visit the Frequently Asked Questions section of the Go!Foton website. Remember, Go!Foton is NOT to be used for urgent needs. For medical emergencies, dial 911. Now available from your iPhone and Android! Please provide this summary of care documentation to your next provider. Your primary care clinician is listed as Umair Valentin. If you have any questions after today's visit, please call [phone].

## 2018-07-09 NOTE — PROGRESS NOTES
Chief Complaint   Patient presents with    Sleep Problem     since last doctor visit    Anxiety     Patient feels her anxiety symtpoms are \"through the roof\" for about 2 weeks     1. Have you been to the ER, urgent care clinic since your last visit? Hospitalized since your last visit? No    2. Have you seen or consulted any other health care providers outside of the [address] [place] [address] since your last visit? Include any pap smears or colon screening.  No

## 2018-07-09 NOTE — TELEPHONE ENCOUNTER
Per call from Omar, ph [phone]      Notes that they have received 3 different strengths for Buspar within a week and need verification as to what patient will be taking    Also asking if sertraline will be changed to citalopram?    thanks

## 2018-07-10 NOTE — PROGRESS NOTES
[address] [place] [address]    Subjective:     CC: Worsening anxiety  History provided by patient    HPI:  Miss Harvinder Restrepo is a pleasant, well known pt of mine who presents to clinic today to follow-up on anxiety symptoms. She was diagnosed with MDD and JOHAN after initial presentation with complaints of insomnia. She was placed on Trazodone qhs and buspar 7.5 mg bid with later titration to TID. She initially saw an improvement in her symptoms but returns today with complaints of worsening anxiety and insomnia. States she has had noticeable episodes over the last week of extreme flushing, palpitations and feeling overwhelmed. 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 Denies any new stressors or suicidal ideation. She was referred to [facility] counseling services at our last meeting but states a fee for service was required, which she cannot afford. She admits to trying a clonazepam pill from her sister, which seemed to have worked really well for her. She denies any hx of illicit drug use. Past Medical History:   Diagnosis Date    Gall stones 2014    Heel spur, right 2017     No Known Allergies  Current Outpatient Prescriptions on File Prior to Visit   Medication Sig Dispense Refill    traZODone (DESYREL) 100 mg tablet Take 1 Tab by mouth nightly. 30 Tab 1    levonorgestrel (MIRENA) 20 mcg/24 hr (5 years) IUD 1 each by IntraUTERine route once.  doxylamine succinate (UNISOM) 25 mg tablet Take 25 mg by mouth nightly as needed for Sleep.  diclofenac potassium (CATAFLAM) 50 mg tablet Take 50 mg by mouth three (3) times daily. No current facility-administered medications on file prior to visit.       Family History   Problem Relation Age of Onset    No Known Problems Mother     No Known Problems Father      Social History     Social History    Marital status: SINGLE     Spouse name: N/A    Number of children: N/A    Years of education: N/A     Social History Main Topics  Smoking status: Former Smoker    Smokeless tobacco: Never Used      Comment: vapes    Alcohol use 0.0 oz/week      Comment: occasionally    Drug use: No    Sexual activity: Yes     Partners: Male     Birth control/ protection: Condom     Other Topics Concern    None     Social History Narrative     Past Surgical History:   Procedure Laterality Date    HX CHOLECYSTECTOMY      HX GI  9/16/14    gallbladder    HX HEENT      T&A    HX ORTHOPAEDIC  1994    left arm surgery.  HX ORTHOPAEDIC  2015;2017    fx right humerous; plate and 12 screws removed from [address] [place] [address]       Patient Active Problem List   Diagnosis Code    Dysmenorrhea N94.6    Attention deficit R41.840    Severe episode of recurrent major depressive disorder ([facility] [address] F33.2    JOHAN (generalized anxiety disorder) F41.1           Review of Systems   All other systems reviewed and are negative. Objective:     Visit Vitals    /79 (BP 1 Location: Left arm, BP Patient Position: Sitting)    Pulse 84    Temp 98.6 °F (37 °C) (Oral)    Resp 16    Ht 5' 2\" (1.575 m)    Wt 205 lb 3.2 oz (93.1 kg)    SpO2 98%    BMI 37.53 kg/m2        Physical Exam   Constitutional: She is oriented to person, place, and time. AOX3, NAD   HENT:   Head: Normocephalic. Mouth/Throat: Oropharynx is clear and moist.   Eyes: Conjunctivae and EOM are normal. Pupils are equal, round, and reactive to light. No scleral icterus. Cardiovascular: Normal rate, regular rhythm and normal heart sounds. No murmur heard. Pulmonary/Chest: Effort normal and breath sounds normal. No respiratory distress. Abdominal: Soft. Bowel sounds are normal. She exhibits no distension. There is no tenderness. Musculoskeletal: She exhibits no edema, tenderness or deformity. Neurological: She is alert and oriented to person, place, and time. No cranial nerve deficit. Coordination normal.   Skin: Skin is warm and dry.    Psychiatric: She has a normal mood and affect. Her behavior is normal. Judgment and thought content normal.       Pertinent Labs/Studies:      Assessment and orders:       JOHAN (generalized anxiety disorder)  -     Will increase Buspar dose to 15mg TID and initiate Celexa 10mg daily  -     Pt states she has not tolerated Zoloft in the past  -     Continue with Trazodone 100mg will consider dose increase pending response to Buspar and Celexa  -     Cautious med changes given disclosed hx of unprescribed clonazepam use  -     Provided resources for community mental health programs and encouraged follow-up with [facility] department. Severe episode of recurrent major depressive disorder, without psychotic features ([facility] [address]        -     See management of JOHAN above      I have reviewed patient medical and social history and medications. I have reviewed pertinent labs results and other data. I have discussed the diagnosis with the patient and the intended plan as seen in the above orders. The patient has received an after-visit summary and questions were answered concerning future plans. I have discussed medication side effects and warnings with the patient as well.     Silver Gee MD  Resident [address] [facility] [address] [place]  07/09/18    Patient discussed and seen with Dr. Nikky Riojas, Attending Physician

## 2018-07-16 ENCOUNTER — OFFICE VISIT (OUTPATIENT)
Dept: FAMILY MEDICINE CLINIC | Age: 33
End: 2018-07-16

## 2018-07-16 VITALS
WEIGHT: 207 LBS | HEIGHT: 62 IN | HEART RATE: 81 BPM | BODY MASS INDEX: 38.09 KG/M2 | RESPIRATION RATE: 18 BRPM | TEMPERATURE: 98.5 F | SYSTOLIC BLOOD PRESSURE: 127 MMHG | DIASTOLIC BLOOD PRESSURE: 82 MMHG | OXYGEN SATURATION: 99 %

## 2018-07-16 DIAGNOSIS — F41.1 GAD (GENERALIZED ANXIETY DISORDER): Primary | ICD-10-CM

## 2018-07-16 DIAGNOSIS — F33.1 MODERATE EPISODE OF RECURRENT MAJOR DEPRESSIVE DISORDER (HCC): ICD-10-CM

## 2018-07-16 PROBLEM — E66.01 SEVERE OBESITY (BMI 35.0-39.9): Status: ACTIVE | Noted: 2018-07-16

## 2018-07-16 NOTE — MR AVS SNAPSHOT
[address] 
[phone] Patient: Leon Boyd MRN: [id number] WRJ:[birth year] Visit Information Date & Time Provider Department Dept. Phone Encounter #  
 7/16/2018  4:00 PM Wilman Daniels MD [address] [place] [address] [phone] [id number] Follow-up Instructions Return in about 2 weeks (around 7/30/2018). Upcoming Health Maintenance Date Due  
 PAP AKA CERVICAL CYTOLOGY 9/26/2017 Influenza Age 5 to Adult 8/1/2018 DTaP/Tdap/Td series (2 - Td) 9/26/2024 Allergies as of 7/16/2018  Review Complete On: 7/16/2018 By: Donnie Castanon LPN No Known Allergies Current Immunizations  Reviewed on 7/16/2018 Name Date Tdap 9/26/2014 Reviewed by Donnie Castanon LPN on 1/29/0477 at  4:05 PM  
Vitals BP Pulse Temp Resp Height(growth percentile) Weight(growth percentile) 127/82 (BP 1 Location: Left arm, BP Patient Position: Sitting) 81 98.5 °F (36.9 °C) (Oral) 18 5' 2\" (1.575 m) 207 lb (93.9 kg) SpO2 BMI OB Status Smoking Status 99% 37.86 kg/m2 IUD Former Smoker Vitals History BMI and BSA Data Body Mass Index Body Surface Area  
 37.86 kg/m 2 2.03 m 2 Preferred Pharmacy Pharmacy Name Phone 500 [address], [facility] [facility] [address] [facility] [address] [phone] Your Updated Medication List  
  
   
This list is accurate as of 7/16/18  5:11 PM.  Always use your most recent med list.  
  
  
  
  
 * busPIRone 10 mg tablet Commonly known as:  BUSPAR Take 1 Tab by mouth three (3) times daily. * busPIRone 5 mg tablet Commonly known as:  BUSPAR Take 1 Tab by mouth three (3) times daily (with meals). citalopram 10 mg tablet Commonly known as:  Gerard Flatter Take 1 Tab by mouth daily. diclofenac potassium 50 mg tablet Commonly known as:  CATAFLAM  
Take 50 mg by mouth three (3) times daily. doxylamine succinate 25 mg tablet Commonly known as:  Ormandy Birch Take 25 mg by mouth nightly as needed for Sleep. MIRENA 20 mcg/24 hr (5 years) Iud  
Generic drug:  levonorgestrel 1 each by IntraUTERine route once. traZODone 100 mg tablet Commonly known as:  Emaline Sober Take 1 Tab by mouth nightly. * Notice: This list has 2 medication(s) that are the same as other medications prescribed for you. Read the directions carefully, and ask your doctor or other care provider to review them with you. Follow-up Instructions Return in about 2 weeks (around 7/30/2018). Introducing [facility] & HEALTH SERVICES! Cece Patino introduces [employer] patient portal. Now you can access parts of your medical record, email your doctor's office, and request medication refills online. 1. In your internet browser, go to [email or web address] and Form 2. Click on the First Time User? Click Here link in the Sign In box. You will see the New Member Sign Up page. 3. Enter your [employer] Access Code exactly as it appears below. You will not need to use this code after youve completed the sign-up process. If you do not sign up before the expiration date, you must request a new code. · [employer] Access Code: [email or web address] Expires: 9/11/2018  2:31 PM 
 
4. Enter the last four digits of your Social Security Number (xxxx) and Date of Birth (mm/dd/yyyy) as indicated and click Submit. You will be taken to the next sign-up page. 5. Create a Chatwalat ID. This will be your [employer] login ID and cannot be changed, so think of one that is secure and easy to remember. 6. Create a [employer] password. You can change your password at any time. 7. Enter your Password Reset Question and Answer. This can be used at a later time if you forget your password. 8. Enter your e-mail address. You will receive e-mail notification when new information is available in [address] 9. Click Sign Up. You can now view and download portions of your medical record. 10. Click the Download Summary menu link to download a portable copy of your medical information. If you have questions, please visit the Frequently Asked Questions section of the Etacts website. Remember, [employer] is NOT to be used for urgent needs. For medical emergencies, dial 911. Now available from your iPhone and Android! Please provide this summary of care documentation to your next provider. Your primary care clinician is listed as Jodie Solis. If you have any questions after today's visit, please call [phone].

## 2018-07-16 NOTE — PROGRESS NOTES
1. Have you been to the ER, urgent care clinic since your last visit? Hospitalized since your last visit? No    2. Have you seen or consulted any other health care providers outside of the [address] [place] [address] since your last visit? Include any pap smears or colon screening. No    Chief Complaint   Patient presents with    Anxiety     follow-up    Depression       Blood pressure 127/82, pulse 81, temperature 98.5 °F (36.9 °C), temperature source Oral, resp. rate 18, height 5' 2\" (1.575 m), weight 207 lb (93.9 kg), SpO2 99 %. Per patient states since taking Buspar she has been getting headaches and eye pain almost everyday.

## 2018-07-17 ENCOUNTER — TELEPHONE (OUTPATIENT)
Dept: FAMILY MEDICINE CLINIC | Age: 33
End: 2018-07-17

## 2018-07-17 NOTE — TELEPHONE ENCOUNTER
Per call from patient,  at her visit of yesterday you told her to follow up in 3 weeks and which she will be out of town 1st week in August. Patient states your schedule is not in for August as well. Patient will need a refill of her trzadone 100 mg at that time.     Pt ph [phone]

## 2018-07-18 NOTE — PROGRESS NOTES
[address] [place] [address]    Subjective:     CC:Anxiety  History provided by patient and chart review    HPI:  27 yr old female with ongoing anxiety and depression who is followed weekly for anxiety and depression. Seen last week for worsening symptoms and started on Celexa 10mg. Buspar was up titrated to 15 mg TID and Trazodone maintained at 100mg. Today, she reports marked improvement in her mood and sleep. Denies any anxiety symptoms over the last week. complains of ployphagia and a 10lbss weight gain over the last month. Has not had occasion to follow-up with counseling due to cost. Currently a planning a trip to [place] with her trips. Hopes to have a relaxing trip. Past Medical History:   Diagnosis Date    Gall stones 2014    Heel spur, right 2017     No Known Allergies  Current Outpatient Prescriptions on File Prior to Visit   Medication Sig Dispense Refill    busPIRone (BUSPAR) 10 mg tablet Take 1 Tab by mouth three (3) times daily. 30 Tab 0    busPIRone (BUSPAR) 5 mg tablet Take 1 Tab by mouth three (3) times daily (with meals). 30 Tab 0    citalopram (CELEXA) 10 mg tablet Take 1 Tab by mouth daily. 30 Tab 0    traZODone (DESYREL) 100 mg tablet Take 1 Tab by mouth nightly. 30 Tab 1    levonorgestrel (MIRENA) 20 mcg/24 hr (5 years) IUD 1 each by IntraUTERine route once.  doxylamine succinate (UNISOM) 25 mg tablet Take 25 mg by mouth nightly as needed for Sleep.  diclofenac potassium (CATAFLAM) 50 mg tablet Take 50 mg by mouth three (3) times daily. No current facility-administered medications on file prior to visit.       Family History   Problem Relation Age of Onset    No Known Problems Mother     No Known Problems Father      Social History     Social History    Marital status: SINGLE     Spouse name: N/A    Number of children: N/A    Years of education: N/A     Social History Main Topics    Smoking status: Former Smoker    Smokeless tobacco: Never Used      Comment: vapes    Alcohol use 0.0 oz/week      Comment: occasionally    Drug use: No    Sexual activity: Yes     Partners: Male     Birth control/ protection: Condom     Other Topics Concern    None     Social History Narrative     Past Surgical History:   Procedure Laterality Date    HX CHOLECYSTECTOMY      HX GI  9/16/14    gallbladder    HX HEENT      T&A    HX ORTHOPAEDIC  1994    left arm surgery. Keturah Duckworth ORTHOPAEDIC  2015;2017    fx right humerous; plate and 12 screws removed from humerous   [address]       Patient Active Problem List   Diagnosis Code    Dysmenorrhea N94.6    Attention deficit R41.840    Severe episode of recurrent major depressive disorder ([facility] [address] F33.2    JOHAN (generalized anxiety disorder) F41.1    Severe obesity (BMI 35.0-39.9) ([facility]) E66.01         Review of Systems   Neurological: Negative for dizziness, sensory change, speech change, focal weakness and headaches. Psychiatric/Behavioral: Negative for depression, hallucinations, memory loss, substance abuse and suicidal ideas. The patient is nervous/anxious and has insomnia. Objective:     Visit Vitals    /82 (BP 1 Location: Left arm, BP Patient Position: Sitting)    Pulse 81    Temp 98.5 °F (36.9 °C) (Oral)    Resp 18    Ht 5' 2\" (1.575 m)    Wt 207 lb (93.9 kg)    SpO2 99%    BMI 37.86 kg/m2        Physical Exam   Constitutional: She is oriented to person, place, and time. AOX3, NAD   Cardiovascular: Normal rate, regular rhythm and normal heart sounds. Pulmonary/Chest: Effort normal and breath sounds normal. No respiratory distress. Neurological: She is alert and oriented to person, place, and time. No cranial nerve deficit. She exhibits normal muscle tone. Coordination normal.   Psychiatric: She has a normal mood and affect.  Her behavior is normal. Judgment and thought content normal.       Pertinent Labs/Studies:      Assessment and orders:     1) JOHAN (generalized anxiety disorder)  2) Moderate episode of recurrent major depressive disorder ([facility] [address]    - Symptoms improving. Will maintain Trazodone 100mg qhs, Celexa 10mg daily and Buspar 15mg TID  - Counseled on need for healthier dieting and exercise  - Awaiting insurance change to establish [facility] care  - Has upcoming trip in August. Return in 2 weeks for visit prior to travels. I have reviewed patient medical and social history and medications. I have reviewed pertinent labs results and other data. I have discussed the diagnosis with the patient and the intended plan as seen in the above orders. The patient has received an after-visit summary and questions were answered concerning future plans. I have discussed medication side effects and warnings with the patient as well.     Demetria Zepeda MD  Resident [address] [facility] [address] [place]  07/17/18    Patient discussed with Dr. Lulu Pulido, Attending Physician

## 2018-07-19 ENCOUNTER — OFFICE VISIT (OUTPATIENT)
Dept: FAMILY MEDICINE CLINIC | Age: 33
End: 2018-07-19

## 2018-07-19 VITALS
SYSTOLIC BLOOD PRESSURE: 113 MMHG | DIASTOLIC BLOOD PRESSURE: 83 MMHG | BODY MASS INDEX: 37.54 KG/M2 | OXYGEN SATURATION: 97 % | TEMPERATURE: 99.1 F | HEIGHT: 62 IN | RESPIRATION RATE: 16 BRPM | WEIGHT: 204 LBS | HEART RATE: 75 BPM

## 2018-07-19 DIAGNOSIS — F33.2 SEVERE EPISODE OF RECURRENT MAJOR DEPRESSIVE DISORDER, WITHOUT PSYCHOTIC FEATURES (HCC): Primary | ICD-10-CM

## 2018-07-19 DIAGNOSIS — F41.1 GAD (GENERALIZED ANXIETY DISORDER): ICD-10-CM

## 2018-07-19 RX ORDER — LORAZEPAM 0.5 MG/1
0.5 TABLET ORAL DAILY
Qty: 7 TAB | Refills: 0 | Status: SHIPPED | OUTPATIENT
Start: 2018-07-19 | End: 2018-07-23 | Stop reason: SDUPTHER

## 2018-07-19 NOTE — PATIENT INSTRUCTIONS
-Stop buspar  -Take 0.5 mg ativan prn     Lorazepam (By injection)   Lorazepam (cxy-CT-y-axel)  Treats seizures. Also used to relieve anxiety before surgery. Brand Name(s): Ativan, LORazepam Novaplus   There may be other brand names for this medicine. When This Medicine Should Not Be Used: This medicine is not right for everyone. You should not receive it if you had an allergic reaction to lorazepam, benzyl alcohol, polyethylene glycol, propylene glycol, or similar medicines, if you are pregnant, or if you have narrow-angle glaucoma, sleep apnea, or severe lung disease. How to Use This Medicine:   Injectable  · Your doctor will prescribe your exact dose and tell you how often it should be given. This medicine is given a shot into a muscle or vein. · A nurse or other health provider will give you this medicine. Drugs and Foods to Avoid:   Ask your doctor or pharmacist before using any other medicine, including over-the-counter medicines, vitamins, and herbal products. · Some foods and medicines can affect how lorazepam works. Tell your doctor if you are using any of the following:  ¨ Clozapine, haloperidol, loxapine, phenobarbital, probenecid, scopolamine, or valproate  ¨ Birth control pill  ¨ Medicine to treat depression, including MAO inhibitor  ¨ Phenothiazine medicine  · Tell your doctor if you use anything else that makes you sleepy. Some examples are allergy medicine, narcotic pain medicine, and alcohol. · Do not drink alcohol while you are using this medicine. Warnings While Using This Medicine:   · It is not safe to take this medicine during pregnancy. It could harm an unborn baby. Tell your doctor right away if you become pregnant. · Tell your doctor if you are breastfeeding, or if you have kidney disease, liver disease, glaucoma, lung disease or breathing problems, or a history of alcohol or drug abuse.   · This medicine may cause respiratory depression (serious breathing problem that can be life-threatening), when used with narcotic pain medicines. · This medicine may make you dizzy or drowsy. Do not drive or do anything else that could be dangerous until you know how this medicine affects you. · Your doctor will check your progress and the effects of this medicine at regular visits. Keep all appointments. Possible Side Effects While Using This Medicine:   Call your doctor right away if you notice any of these side effects:  · Allergic reaction: Itching or hives, swelling in your face or hands, swelling or tingling in your mouth or throat, chest tightness, trouble breathing  · Blue lips, fingernails, or skin  · Extreme tiredness or weakness, slow heartbeat, problems with coordination or walking  · Worsening seizures  If you notice these less serious side effects, talk with your doctor:   · Drowsiness, sleepiness  · Pain, itching, burning, swelling, or a lump under your skin where the needle is placed  If you notice other side effects that you think are caused by this medicine, tell your doctor. Call your doctor for medical advice about side effects. You may report side effects to FDA at 7-241-FDA-9727  © 2017 [address] Information is for End User's use only and may not be sold, redistributed or otherwise used for commercial purposes. The above information is an  only. It is not intended as medical advice for individual conditions or treatments. Talk to your doctor, nurse or pharmacist before following any medical regimen to see if it is safe and effective for you.

## 2018-07-19 NOTE — PROGRESS NOTES
Identified Patient with two Patient identifiers (Name and ). Two Patient Identifiers confirmed. Reviewed record in preparation for visit and have obtained necessary documentation. Chief Complaint   Patient presents with    Medication Evaluation     Per patient requesting second opinion on DX and Medication - States she was experiencing headaches and increase appetite with medication. Tried Tylenol and Alever for headaches without relief    Anxiety       Visit Vitals    /83 (BP 1 Location: Left arm, BP Patient Position: Sitting)    Pulse 75    Temp 99.1 °F (37.3 °C) (Oral)    Resp 16    Ht 5' 2\" (1.575 m)    Wt 204 lb (92.5 kg)    SpO2 97%    BMI 37.31 kg/m2       1. Have you been to the ER, urgent care clinic since your last visit? Hospitalized since your last visit? No    2. Have you seen or consulted any other health care providers outside of the [address] [place] [address] since your last visit? Include any pap smears or colon screening.  No

## 2018-07-19 NOTE — MR AVS SNAPSHOT
[address] 
[phone] Patient: Jen Keene MRN: [id number]:[id number] Visit Information Date & Time Provider Department Dept. Phone Encounter #  
 7/19/2018  2:30 PM Joaquin Venegas MD [address] [place] [address] [phone] [id number] Upcoming Health Maintenance Date Due  
 PAP AKA CERVICAL CYTOLOGY 9/26/2017 Influenza Age 5 to Adult 8/1/2018 DTaP/Tdap/Td series (2 - Td) 9/26/2024 Allergies as of 7/19/2018  Review Complete On: 7/17/2018 By: Parul Hernandez MD  
 No Known Allergies Current Immunizations  Reviewed on 7/16/2018 Name Date Tdap 9/26/2014 Not reviewed this visit You Were Diagnosed With   
  
 Codes Comments Severe episode of recurrent major depressive disorder, without psychotic features [facility]    -  Primary ICD-10-CM: F33.2 ICD-9-CM: 296.33   
 JOHAN (generalized anxiety disorder)     ICD-10-CM: F41.1 ICD-9-CM: 300.02 Vitals BP Pulse Temp Resp Height(growth percentile) Weight(growth percentile) 113/83 (BP 1 Location: Left arm, BP Patient Position: Sitting) 75 99.1 °F (37.3 °C) (Oral) 16 5' 2\" (1.575 m) 204 lb (92.5 kg) SpO2 BMI OB Status Smoking Status 97% 37.31 kg/m2 IUD Former Smoker Vitals History BMI and BSA Data Body Mass Index Body Surface Area  
 37.31 kg/m 2 2.01 m 2 Preferred Pharmacy Pharmacy Name Phone [facility] [address] [place] [address], [address] [place] [address] [address] [facility] [address] [phone] Your Updated Medication List  
  
   
This list is accurate as of 7/19/18  3:18 PM.  Always use your most recent med list.  
  
  
  
  
 * busPIRone 10 mg tablet Commonly known as:  BUSPAR Take 1 Tab by mouth three (3) times daily. * busPIRone 5 mg tablet Commonly known as:  BUSPAR Take 1 Tab by mouth three (3) times daily (with meals). citalopram 10 mg tablet Commonly known as:  Christiano Cates Take 1 Tab by mouth daily. diclofenac potassium 50 mg tablet Commonly known as:  CATAFLAM  
Take 50 mg by mouth three (3) times daily. doxylamine succinate 25 mg tablet Commonly known as:  Venkat Batch Take 25 mg by mouth nightly as needed for Sleep. LORazepam 0.5 mg tablet Commonly known as:  ATIVAN Take 1 Tab by mouth daily for 7 days. Max Daily Amount: 0.5 mg. MIRENA 20 mcg/24 hr (5 years) Iud  
Generic drug:  levonorgestrel 1 each by IntraUTERine route once. traZODone 100 mg tablet Commonly known as:  Paul Galeana Take 1 Tab by mouth nightly. * Notice: This list has 2 medication(s) that are the same as other medications prescribed for you. Read the directions carefully, and ask your doctor or other care provider to review them with you. Prescriptions Printed Refills LORazepam (ATIVAN) 0.5 mg tablet 0 Sig: Take 1 Tab by mouth daily for 7 days. Max Daily Amount: 0.5 mg.  
 Class: Print Route: Oral  
  
Patient Instructions Lorazepam (By injection) Lorazepam (pek-AY-l-axel) Treats seizures. Also used to relieve anxiety before surgery. Brand Name(s): Ativan, LORazepam Novaplus There may be other brand names for this medicine. When This Medicine Should Not Be Used: This medicine is not right for everyone. You should not receive it if you had an allergic reaction to lorazepam, benzyl alcohol, polyethylene glycol, propylene glycol, or similar medicines, if you are pregnant, or if you have narrow-angle glaucoma, sleep apnea, or severe lung disease. How to Use This Medicine:  
Injectable · Your doctor will prescribe your exact dose and tell you how often it should be given. This medicine is given a shot into a muscle or vein. · A nurse or other health provider will give you this medicine. Drugs and Foods to Avoid: Ask your doctor or pharmacist before using any other medicine, including over-the-counter medicines, vitamins, and herbal products. · Some foods and medicines can affect how lorazepam works. Tell your doctor if you are using any of the following: ¨ Clozapine, haloperidol, loxapine, phenobarbital, probenecid, scopolamine, or valproate ¨ Birth control pill ¨ Medicine to treat depression, including MAO inhibitor ¨ Phenothiazine medicine · Tell your doctor if you use anything else that makes you sleepy. Some examples are allergy medicine, narcotic pain medicine, and alcohol. · Do not drink alcohol while you are using this medicine. Warnings While Using This Medicine: · It is not safe to take this medicine during pregnancy. It could harm an unborn baby. Tell your doctor right away if you become pregnant. · Tell your doctor if you are breastfeeding, or if you have kidney disease, liver disease, glaucoma, lung disease or breathing problems, or a history of alcohol or drug abuse. · This medicine may cause respiratory depression (serious breathing problem that can be life-threatening), when used with narcotic pain medicines. · This medicine may make you dizzy or drowsy. Do not drive or do anything else that could be dangerous until you know how this medicine affects you. · Your doctor will check your progress and the effects of this medicine at regular visits. Keep all appointments. Possible Side Effects While Using This Medicine:  
Call your doctor right away if you notice any of these side effects: · Allergic reaction: Itching or hives, swelling in your face or hands, swelling or tingling in your mouth or throat, chest tightness, trouble breathing · Blue lips, fingernails, or skin · Extreme tiredness or weakness, slow heartbeat, problems with coordination or walking · Worsening seizures If you notice these less serious side effects, talk with your doctor: · Drowsiness, sleepiness · Pain, itching, burning, swelling, or a lump under your skin where the needle is placed If you notice other side effects that you think are caused by this medicine, tell your doctor. Call your doctor for medical advice about side effects. You may report side effects to FDA at 4-013-XRW-6377 © 2017 Jossie Navarro Information is for End User's use only and may not be sold, redistributed or otherwise used for commercial purposes. The above information is an  only. It is not intended as medical advice for individual conditions or treatments. Talk to your doctor, nurse or pharmacist before following any medical regimen to see if it is safe and effective for you. Introducing [facility] & HEALTH SERVICES! Nelly Leung introduces "[employer]" patient portal. Now you can access parts of your medical record, email your doctor's office, and request medication refills online. 1. In your internet browser, go to [email or web address] 2. Click on the First Time User? Click Here link in the Sign In box. You will see the New Member Sign Up page. 3. Enter your "[employer]" Access Code exactly as it appears below. You will not need to use this code after youve completed the sign-up process. If you do not sign up before the expiration date, you must request a new code. · "[employer]" Access Code: [email or web address] Expires: 9/11/2018  2:31 PM 
 
4. Enter the last four digits of your Social Security Number (xxxx) and Date of Birth (mm/dd/yyyy) as indicated and click Submit. You will be taken to the next sign-up page. 5. Create a [employer] ID. This will be your "[employer]" login ID and cannot be changed, so think of one that is secure and easy to remember. 6. Create a "[employer]" password. You can change your password at any time. 7. Enter your Password Reset Question and Answer. This can be used at a later time if you forget your password. 8. Enter your e-mail address. You will receive e-mail notification when new information is available in [address] 9. Click Sign Up. You can now view and download portions of your medical record. 10. Click the Download Summary menu link to download a portable copy of your medical information. If you have questions, please visit the Frequently Asked Questions section of the [employer] website. Remember, [employer] is NOT to be used for urgent needs. For medical emergencies, dial 911. Now available from your iPhone and Android! Please provide this summary of care documentation to your next provider. Your primary care clinician is listed as Iliamna Heading. If you have any questions after today's visit, please call [phone].

## 2018-07-19 NOTE — PROGRESS NOTES
Chief Complaint: Severe depression and JOHAN     Subjective  Ysabel Hardwick is a 27 y.o. female  with ongoing anxiety and depression who presents for follow up. Pt was seen  3 days ago for follow up and states that she complained about worsening headache and polyphagia with 10 Ibs weight gain. Pt thinks these all started when she was placed on Buspar. She is currently on Buspar, celexa and Trazodone. She states that her sleep and mood has improved since started on Trazodone. Denies any suicidal or homicidal ideation at this time. She would like to discuss other options for her anxiety as she states that it seems to be worsening. She also did some goggle search and thinks she has ADHD. Pt has not been able to see a counselor because of lack of insurance. Of note, pt states that she spent 7 years in [facility] at what time she was on Effexor but unsure whether it actually helped at that time. Smoke vapor and drinks socially. Allergies - reviewed:   No Known Allergies      Medications - reviewed:   Current Outpatient Prescriptions   Medication Sig    LORazepam (ATIVAN) 0.5 mg tablet Take 1 Tab by mouth daily for 7 days. Max Daily Amount: 0.5 mg.    traZODone (DESYREL) 100 mg tablet Take 1 Tab by mouth nightly.  levonorgestrel (MIRENA) 20 mcg/24 hr (5 years) IUD 1 each by IntraUTERine route once.  busPIRone (BUSPAR) 10 mg tablet Take 1 Tab by mouth three (3) times daily.  busPIRone (BUSPAR) 5 mg tablet Take 1 Tab by mouth three (3) times daily (with meals).  citalopram (CELEXA) 10 mg tablet Take 1 Tab by mouth daily.  doxylamine succinate (UNISOM) 25 mg tablet Take 25 mg by mouth nightly as needed for Sleep.  diclofenac potassium (CATAFLAM) 50 mg tablet Take 50 mg by mouth three (3) times daily. No current facility-administered medications for this visit.           Past Medical History - reviewed:  Past Medical History:   Diagnosis Date    Gall stones 2014    Heel spur, right 2017 Past Surgical History - reviewed:   Past Surgical History:   Procedure Laterality Date    HX CHOLECYSTECTOMY      HX GI  9/16/14    gallbladder    HX HEENT      T&A    HX ORTHOPAEDIC  1994    left arm surgery.  HX ORTHOPAEDIC  2015;2017    fx right humerous; plate and 12 screws removed from humerous    HX TONSILLECTOMY  1996         Social History - reviewed:  Social History     Social History    Marital status: SINGLE     Spouse name: N/A    Number of children: N/A    Years of education: N/A     Occupational History    Not on file. Social History Main Topics    Smoking status: Former Smoker    Smokeless tobacco: Never Used      Comment: vapes    Alcohol use 0.0 oz/week      Comment: occasionally    Drug use: No    Sexual activity: Yes     Partners: Male     Birth control/ protection: Condom     Other Topics Concern    Not on file     Social History Narrative     Family History - reviewed:  Family History   Problem Relation Age of Onset    No Known Problems Mother     No Known Problems Father      Immunizations - reviewed:   Immunization History   Administered Date(s) Administered    Tdap 09/26/2014     ROS    A comprehensive review of systems was negative except for that written in the History of Present Illness.      Physical Exam  Visit Vitals    /83 (BP 1 Location: Left arm, BP Patient Position: Sitting)    Pulse 75    Temp 99.1 °F (37.3 °C) (Oral)    Resp 16    Ht 5' 2\" (1.575 m)    Wt 204 lb (92.5 kg)    SpO2 97%    BMI 37.31 kg/m2       General appearance - alert, well appearing, and in no distress  Chest - clear to auscultation, no wheezes, rales or rhonchi, symmetric air entry  Heart - normal rate, regular rhythm, normal S1, S2, no murmurs, rubs, clicks or gallops  Neurological - alert, oriented, normal speech, no focal findings or movement disorder noted  Extremities - peripheral pulses normal, no pedal edema, no clubbing or cyanosis  Psych - Pt normal mood and affect. Normal insight. Assessment/Plan    ICD-10-CM ICD-9-CM    1. Severe episode of recurrent major depressive disorder, without psychotic features ([facility] F33.2 296.33 LORazepam (ATIVAN) 0.5 mg tablet   2. JOHAN (generalized anxiety disorder) F41.1 300.02 LORazepam (ATIVAN) 0.5 mg tablet     Severe depression and JOHAN: Scored 22 on the PHQ-9 and 21 on JOHAN-7. No suicidal or homicidal ideation.  -Discontinued Buspar given adverse effects of worsening headache  -Cont Celexa  -Prescribed 7 tablets of 0.5 mg ativan, enough for Celexa to have its full effects. Pt strictly instructed that this is a one time prescription and advised on when to use it.  -Cont Trazone for sleep  -Encouraged to start exercising   -Encouraged to schedule appt with a counselor. Pt states that she has not been able because her insurance will be effective only from September 2018.  -Gave information to contact; [employer]:  24 hour crisis line: [phone]  Daytime number: [phone]  Psychiatry, counseling, case management, drug/alcohol addiction     Pt to follow up in 2 weeks with Dr. Martha Le    Follow-up Disposition: Not on File    I have discussed the diagnosis with the patient and the intended plan as seen in the above orders. Patient verbalized understanding of the plan and agrees with the plan. The patient has received an after-visit summary and questions were answered concerning future plans. I have discussed medication side effects and warnings with the patient as well. Informed patient to return to the office if new symptoms arise.       Felipe Dyer MD  Family Medicine Resident

## 2018-07-23 ENCOUNTER — OFFICE VISIT (OUTPATIENT)
Dept: FAMILY MEDICINE CLINIC | Age: 33
End: 2018-07-23

## 2018-07-23 VITALS
HEIGHT: 62 IN | TEMPERATURE: 98.7 F | SYSTOLIC BLOOD PRESSURE: 112 MMHG | HEART RATE: 76 BPM | WEIGHT: 204 LBS | BODY MASS INDEX: 37.54 KG/M2 | RESPIRATION RATE: 16 BRPM | DIASTOLIC BLOOD PRESSURE: 73 MMHG | OXYGEN SATURATION: 99 %

## 2018-07-23 DIAGNOSIS — F33.1 MODERATE EPISODE OF RECURRENT MAJOR DEPRESSIVE DISORDER (HCC): ICD-10-CM

## 2018-07-23 DIAGNOSIS — F41.1 GAD (GENERALIZED ANXIETY DISORDER): Primary | ICD-10-CM

## 2018-07-23 RX ORDER — TRAZODONE HYDROCHLORIDE 100 MG/1
100 TABLET ORAL
Qty: 30 TAB | Refills: 1 | Status: SHIPPED | OUTPATIENT
Start: 2018-07-23 | End: 2018-09-24 | Stop reason: DRUGHIGH

## 2018-07-23 RX ORDER — CLONAZEPAM 0.5 MG/1
0.25 TABLET ORAL
Qty: 7 TAB | Refills: 0 | Status: SHIPPED | OUTPATIENT
Start: 2018-07-23 | End: 2022-05-23

## 2018-07-23 RX ORDER — CITALOPRAM 10 MG/1
20 TABLET ORAL DAILY
Qty: 30 TAB | Refills: 1 | Status: SHIPPED | OUTPATIENT
Start: 2018-07-23 | End: 2018-07-25 | Stop reason: SDUPTHER

## 2018-07-23 NOTE — PROGRESS NOTES
Chief Complaint   Patient presents with    Medication Evaluation     pt states currently taking celexa and ativan with no improvement     1. Have you been to the ER, urgent care clinic since your last visit? Hospitalized since your last visit? No    2. Have you seen or consulted any other health care providers outside of the [facility] since your last visit? Include any pap smears or colon screening.  No

## 2018-07-23 NOTE — MR AVS SNAPSHOT
[address] 
[phone] Patient: Spring Aleman MRN: [id number]:[birth year] Visit Information Date & Time Provider Department Dept. Phone Encounter #  
 7/23/2018  4:00 PM Jean Mason MD [facility] [phone] [id number] Follow-up Instructions Return in about 3 days (around 7/26/2018). Upcoming Health Maintenance Date Due  
 PAP AKA CERVICAL CYTOLOGY 9/26/2017 Influenza Age 5 to Adult 8/1/2018 DTaP/Tdap/Td series (2 - Td) 9/26/2024 Allergies as of 7/23/2018  Review Complete On: 7/23/2018 By: Jean Mason MD  
 No Known Allergies Current Immunizations  Reviewed on 7/16/2018 Name Date Tdap 9/26/2014 Not reviewed this visit You Were Diagnosed With   
  
 Codes Comments JOHAN (generalized anxiety disorder)    -  Primary ICD-10-CM: F41.1 ICD-9-CM: 300.02 Vitals BP Pulse Temp Resp Height(growth percentile) Weight(growth percentile) 112/73 76 98.7 °F (37.1 °C) (Oral) 16 5' 2\" (1.575 m) 204 lb (92.5 kg) SpO2 BMI OB Status Smoking Status 99% 37.31 kg/m2 IUD Former Smoker Vitals History BMI and BSA Data Body Mass Index Body Surface Area  
 37.31 kg/m 2 2.01 m 2 Preferred Pharmacy Pharmacy Name Phone [address] [address] [facility] [phone] Your Updated Medication List  
  
   
This list is accurate as of 7/23/18  5:18 PM.  Always use your most recent med list.  
  
  
  
  
 citalopram 10 mg tablet Commonly known as:  Ashtyn Peel Take 1 Tab by mouth daily. clonazePAM 0.5 mg tablet Commonly known as:  Lindy Kava Take 0.5 Tabs by mouth nightly as needed. Max Daily Amount: 0.25 mg.  
  
 diclofenac potassium 50 mg tablet Commonly known as:  CATAFLAM  
Take 50 mg by mouth three (3) times daily. doxylamine succinate 25 mg tablet Commonly known as:  Ivone Lorenzo Take 25 mg by mouth nightly as needed for Sleep. MIRENA 20 mcg/24 hr (5 years) Iud  
Generic drug:  levonorgestrel 1 each by IntraUTERine route once. traZODone 100 mg tablet Commonly known as:  Sumanth Barreto Take 1 Tab by mouth nightly. Prescriptions Printed Refills  
 clonazePAM (KLONOPIN) 0.5 mg tablet 0 Sig: Take 0.5 Tabs by mouth nightly as needed. Max Daily Amount: 0.25 mg.  
 Class: Print Route: Oral  
  
Follow-up Instructions Return in about 3 days (around 7/26/2018). Introducing [facility] & HEALTH SERVICES! [employer] introduces [employer] patient portal. Now you can access parts of your medical record, email your doctor's office, and request medication refills online. 1. In your internet browser, go to [email or web address] 2. Click on the First Time User? Click Here link in the Sign In box. You will see the New Member Sign Up page. 3. Enter your [employer] Access Code exactly as it appears below. You will not need to use this code after youve completed the sign-up process. If you do not sign up before the expiration date, you must request a new code. · [employer] Access Code: [email or web address] Expires: 9/11/2018  2:31 PM 
 
4. Enter the last four digits of your Social Security Number (xxxx) and Date of Birth (mm/dd/yyyy) as indicated and click Submit. You will be taken to the next sign-up page. 5. Create a [email or web address] ID. This will be your [employer] login ID and cannot be changed, so think of one that is secure and easy to remember. 6. Create a [employer] password. You can change your password at any time. 7. Enter your Password Reset Question and Answer. This can be used at a later time if you forget your password. 8. Enter your e-mail address. You will receive e-mail notification when new information is available in [address] 9. Click Sign Up. You can now view and download portions of your medical record. 10. Click the Download Summary menu link to download a portable copy of your medical information. If you have questions, please visit the Frequently Asked Questions section of the [employer] website. Remember, [employer] is NOT to be used for urgent needs. For medical emergencies, dial 911. Now available from your iPhone and Android! Please provide this summary of care documentation to your next provider. Your primary care clinician is listed as Chuy Baron. If you have any questions after today's visit, please call [phone].

## 2018-07-25 RX ORDER — CITALOPRAM 20 MG/1
20 TABLET, FILM COATED ORAL DAILY
Qty: 30 TAB | Refills: 1 | Status: SHIPPED | OUTPATIENT
Start: 2018-07-25 | End: 2018-07-25

## 2018-07-26 NOTE — PROGRESS NOTES
[address] [place] [address]    Subjective:     CC: Anxiety  History provided by patient and chart review    HPI:  27 yr old female with ongoing anxiety and depression who is followed weekly. Has had ongoing medication titration for the last month with Buspar, Trazodone, Zoloft and Celexa. States her symptoms initially improved but have again worsened. Also endorses worsening medication side-effects; with increased headaches on Buspar and weight gain. She was seen last week and Buspar was discontinued due to adverse effects and pt started on a short ativan trial. States ativan has not helped her symptoms and endorses multiple panic attacks since her last visit; citing episodes of tachycardia, diaphoresis, chest pain and feeling overwhelmed at work. She has been referred to [facility] services on multiple occasions as she claims her insurance will not pay for psych visitsbut has not followed-up citing fear of cost. [employer] insurance has recently started billing her for current visits. She denies any suicidal or homicidal ideation. Past Medical History:   Diagnosis Date    Gall stones 2014    Heel spur, right 2017     No Known Allergies  Current Outpatient Prescriptions on File Prior to Visit   Medication Sig Dispense Refill    doxylamine succinate (UNISOM) 25 mg tablet Take 25 mg by mouth nightly as needed for Sleep.  diclofenac potassium (CATAFLAM) 50 mg tablet Take 50 mg by mouth three (3) times daily.  levonorgestrel (MIRENA) 20 mcg/24 hr (5 years) IUD 1 each by IntraUTERine route once. No current facility-administered medications on file prior to visit.       Family History   Problem Relation Age of Onset    No Known Problems Mother     No Known Problems Father      Social History     Social History    Marital status: SINGLE     Spouse name: N/A    Number of children: N/A    Years of education: N/A     Social History Main Topics    Smoking status: Former Smoker    Smokeless tobacco: Never Used      Comment: vapes    Alcohol use 0.0 oz/week      Comment: occasionally    Drug use: No    Sexual activity: Yes     Partners: Male     Birth control/ protection: Condom     Other Topics Concern    None     Social History Narrative     Past Surgical History:   Procedure Laterality Date    HX CHOLECYSTECTOMY      HX GI  9/16/14    gallbladder    HX HEENT      T&A    HX ORTHOPAEDIC  1994    left arm surgery.  HX ORTHOPAEDIC  2015;2017    fx right humerous; plate and 12 screws removed from humerous   32 [facility]       Patient Active Problem List   Diagnosis Code    Dysmenorrhea N94.6    Attention deficit R41.840    Severe episode of recurrent major depressive disorder ([facility] F33.2    JOHAN (generalized anxiety disorder) F41.1    Severe obesity (BMI 35.0-39.9) ([facility]) E66.01           Review of Systems   Neurological: Negative for dizziness and headaches. Psychiatric/Behavioral: Positive for depression. Negative for hallucinations, memory loss, substance abuse and suicidal ideas. The patient is nervous/anxious and has insomnia. Objective:     Visit Vitals    /73    Pulse 76    Temp 98.7 °F (37.1 °C) (Oral)    Resp 16    Ht 5' 2\" (1.575 m)    Wt 204 lb (92.5 kg)    SpO2 99%    BMI 37.31 kg/m2        Physical Exam   Constitutional: She is oriented to person, place, and time. AOX3, NAD   HENT:   Head: Normocephalic and atraumatic. Eyes: Conjunctivae and EOM are normal. Pupils are equal, round, and reactive to light. Cardiovascular: Normal rate, regular rhythm and normal heart sounds. Pulmonary/Chest: Effort normal and breath sounds normal.   Neurological: She is alert and oriented to person, place, and time. No cranial nerve deficit. She exhibits normal muscle tone. Coordination normal.   Psychiatric: She has a normal mood and affect.  Her behavior is normal. Thought content normal.       Pertinent Labs/Studies:      Assessment and orders:     Diagnoses and all orders for this visit:    JOHAN (generalized anxiety disorder)  Moderate episode of recurrent major depressive disorder (HCC)        -    PHQ 9 score of 22 and JOHAN 7 score of 22  -    I Have emphasized to pt that medication management alone will not suffice in her therapy and that CBT is warranted given her current condition. She has been provided community resources on multiple occasions and referred to [facility] services. [facility] services are not free but has not not sought consultation.  -   She is scheduled to follow-up in [facility] stress center in 3 months. -   States she is expecting a change in her insurance next month which will allow for better mental health access and treatment. -    Will initiate trial of clonazePAM (KLONOPIN) 0.5 mg tablet qhs. Emphasized that this will be on a short term basis        -    Continue Trazodone and Increase Celexa to 20mg daily        -    Follow-up in 1 week or earlier as needed        I have reviewed patient medical and social history and medications. I have reviewed pertinent labs results and other data. I have discussed the diagnosis with the patient and the intended plan as seen in the above orders. The patient has received an after-visit summary and questions were answered concerning future plans. I have discussed medication side effects and warnings with the patient as well.     Jose L Khan MD  Resident [address] [facility]  07/25/18    Patient discussed with Dr. Duncan Agrawal, Attending Physician

## 2018-09-24 ENCOUNTER — OFFICE VISIT (OUTPATIENT)
Dept: FAMILY MEDICINE CLINIC | Age: 33
End: 2018-09-24

## 2018-09-24 VITALS
TEMPERATURE: 99 F | HEART RATE: 71 BPM | WEIGHT: 214 LBS | RESPIRATION RATE: 16 BRPM | SYSTOLIC BLOOD PRESSURE: 115 MMHG | HEIGHT: 62 IN | DIASTOLIC BLOOD PRESSURE: 76 MMHG | BODY MASS INDEX: 39.38 KG/M2 | OXYGEN SATURATION: 99 %

## 2018-09-24 DIAGNOSIS — F33.2 SEVERE EPISODE OF RECURRENT MAJOR DEPRESSIVE DISORDER, WITHOUT PSYCHOTIC FEATURES (HCC): Primary | ICD-10-CM

## 2018-09-24 DIAGNOSIS — F41.1 GAD (GENERALIZED ANXIETY DISORDER): ICD-10-CM

## 2018-09-24 RX ORDER — CITALOPRAM 20 MG/1
20 TABLET, FILM COATED ORAL DAILY
Qty: 90 TAB | Refills: 1 | Status: SHIPPED | OUTPATIENT
Start: 2018-09-24 | End: 2019-10-30 | Stop reason: SDUPTHER

## 2018-09-24 RX ORDER — TRAZODONE HYDROCHLORIDE 150 MG/1
150 TABLET ORAL
Qty: 60 TAB | Refills: 0 | Status: SHIPPED | OUTPATIENT
Start: 2018-09-24 | End: 2018-10-30 | Stop reason: SDUPTHER

## 2018-09-24 NOTE — PROGRESS NOTES
[address] [place] [address]    Subjective:     CC: Anxiety and depression follow-up  History provided by patient and chart review    HPI:  Pleasant 26 yr old female who presents for follow-up of anxiety and depression. She is well known to me and has had a difficult course adjusting her medications. She presents for follow-up and reports marked improvement in her mood and anxiety since last seen two months ago. States she has had little no no anxious epeisodes over the last 2 months. Took a month long cross country trip with her children that was very relaxing. States her brother is now out of half-way and things at home are much better with her mother. Her only complaints today are about insomnia. States she was previously sleeping well on trazodone 100qhs but is now having some increased insomnia. She denies any suicidal or homicidal ideation. Has been referred to mental health services in the past but is still awaiting reinstatement of her insurance. Denies any homicidal or suicidal ideation. Currently takes Trazodone 100mg abd Celexa 20mg. Past Medical History:   Diagnosis Date    Gall stones 2014    Heel spur, right 2017     No Known Allergies  Current Outpatient Prescriptions on File Prior to Visit   Medication Sig Dispense Refill    clonazePAM (KLONOPIN) 0.5 mg tablet Take 0.5 Tabs by mouth nightly as needed. Max Daily Amount: 0.25 mg. 7 Tab 0    traZODone (DESYREL) 100 mg tablet Take 1 Tab by mouth nightly. 30 Tab 1    doxylamine succinate (UNISOM) 25 mg tablet Take 25 mg by mouth nightly as needed for Sleep.  diclofenac potassium (CATAFLAM) 50 mg tablet Take 50 mg by mouth three (3) times daily.  levonorgestrel (MIRENA) 20 mcg/24 hr (5 years) IUD 1 each by IntraUTERine route once. No current facility-administered medications on file prior to visit.       Family History   Problem Relation Age of Onset    No Known Problems Mother     No Known Problems Father      Social History Social History    Marital status: SINGLE     Spouse name: N/A    Number of children: N/A    Years of education: N/A     Social History Main Topics    Smoking status: Former Smoker    Smokeless tobacco: Never Used      Comment: vapes    Alcohol use 0.0 oz/week      Comment: occasionally    Drug use: No    Sexual activity: Yes     Partners: Male     Birth control/ protection: Condom     Other Topics Concern    None     Social History Narrative     Past Surgical History:   Procedure Laterality Date    HX CHOLECYSTECTOMY      HX GI  9/16/14    gallbladder    HX HEENT      T&A    HX ORTHOPAEDIC  1994    left arm surgery.  HX ORTHOPAEDIC  2015;2017    fx right humerous; plate and 12 screws removed from humerous   32 [facility]       Patient Active Problem List   Diagnosis Code    Dysmenorrhea N94.6    Attention deficit R41.840    Severe episode of recurrent major depressive disorder ([facility] F33.2    JOHAN (generalized anxiety disorder) F41.1    Severe obesity (BMI 35.0-39.9) ([facility] E66.01           Review of Systems   All other systems reviewed and are negative. Objective:     Visit Vitals    /76    Pulse 71    Temp 99 °F (37.2 °C)    Resp 16    Ht 5' 2\" (1.575 m)    Wt 214 lb (97.1 kg)    SpO2 99%    BMI 39.14 kg/m2        Physical Exam   Constitutional: She is oriented to person, place, and time. She appears well-developed and well-nourished. HENT:   Head: Normocephalic and atraumatic. Mouth/Throat: Oropharynx is clear and moist. No oropharyngeal exudate. Eyes: Conjunctivae and EOM are normal. Pupils are equal, round, and reactive to light. No scleral icterus. Neck: Normal range of motion. Neck supple. No thyromegaly present. Cardiovascular: Normal rate, regular rhythm and normal heart sounds. No murmur heard. Pulmonary/Chest: Effort normal and breath sounds normal. No respiratory distress. She has no wheezes. Abdominal: Soft.  Bowel sounds are normal. She exhibits no distension. Neurological: She is alert and oriented to person, place, and time. No cranial nerve deficit. Coordination normal.   Skin: Skin is warm and dry. Psychiatric: She has a normal mood and affect. Her behavior is normal. Judgment and thought content normal.       Pertinent Labs/Studies:      Assessment and orders:     Diagnoses and all orders for this visit:    1) Severe episode of recurrent major depressive disorder, without psychotic features ([facility]    2) JOHAN (generalized anxiety disorder)  -     Currently stable. Increase Trazodone to 150mg qhs  -     Continue Celexa 20mg qhs  -     Continue to press mental health f/u for counselling  -     RTO as needed. I have reviewed patient medical and social history and medications. I have reviewed pertinent labs results and other data. I have discussed the diagnosis with the patient and the intended plan as seen in the above orders. The patient has received an after-visit summary and questions were answered concerning future plans. I have discussed medication side effects and warnings with the patient as well.     Silver Gee MD  Resident [address] [facility] [address] [place]  09/24/18    Patient discussed with Dr. Benji Chowdary, Attending Physician

## 2018-09-24 NOTE — MR AVS SNAPSHOT
[address] 
[phone] Patient: Jen Keene MRN: [id number] DFN:[id number] Visit Information Date & Time Provider Department Dept. Phone Encounter #  
 9/24/2018  3:25 PM [place], [place] 1515 [facility] [phone] [id number] Follow-up Instructions Return if symptoms worsen or fail to improve. Your Appointments 10/2/2018  9:00 AM  
COUNSELING with Grace Lockett MD  
[address]) Appt Note: counseling [address]  
[phone]  
  
   
 [address] [facility] [address] Upcoming Health Maintenance Date Due  
 PAP AKA CERVICAL CYTOLOGY 9/26/2017 Influenza Age 5 to Adult 8/1/2018 DTaP/Tdap/Td series (2 - Td) 9/26/2024 Allergies as of 9/24/2018  Review Complete On: 9/24/2018 By: Corrine Kuhn No Known Allergies Current Immunizations  Reviewed on 7/16/2018 Name Date Tdap 9/26/2014 Not reviewed this visit You Were Diagnosed With   
  
 Codes Comments Severe episode of recurrent major depressive disorder, without psychotic features [facility]    -  Primary ICD-10-CM: F33.2 ICD-9-CM: 296.33   
 JOHAN (generalized anxiety disorder)     ICD-10-CM: F41.1 ICD-9-CM: 300.02 Vitals BP Pulse Temp Resp Height(growth percentile) Weight(growth percentile) 115/76 71 99 °F (37.2 °C) 16 5' 2\" (1.575 m) 214 lb (97.1 kg) SpO2 BMI OB Status Smoking Status 99% 39.14 kg/m2 IUD Former Smoker BMI and BSA Data Body Mass Index Body Surface Area  
 39.14 kg/m 2 2.06 m 2 Preferred Pharmacy Pharmacy Name Phone [address] [facility], [address] [place] [address] Rd[address] [facility] [address] [phone] Your Updated Medication List  
  
   
This list is accurate as of 9/24/18  4:17 PM.  Always use your most recent med list.  
  
 citalopram 20 mg tablet Commonly known as:  Angle Eye Take 1 Tab by mouth daily. clonazePAM 0.5 mg tablet Commonly known as:  Leatha Patch Take 0.5 Tabs by mouth nightly as needed. Max Daily Amount: 0.25 mg.  
  
 diclofenac potassium 50 mg tablet Commonly known as:  CATAFLAM  
Take 50 mg by mouth three (3) times daily. doxylamine succinate 25 mg tablet Commonly known as:  Ivone Maura Take 25 mg by mouth nightly as needed for Sleep. MIRENA 20 mcg/24 hr (5 years) Iud  
Generic drug:  levonorgestrel 1 each by IntraUTERine route once. traZODone 150 mg tablet Commonly known as:  Sumanth Sheller Take 1 Tab by mouth nightly. Prescriptions Sent to Pharmacy Refills  
 traZODone (DESYREL) 150 mg tablet 0 Sig: Take 1 Tab by mouth nightly. Class: Normal  
 Pharmacy: [facility] [address] SAM CARRASQUILLO [address], [facility] [place] [address] Road Ph #: [phone] Route: Oral  
 citalopram (CELEXA) 20 mg tablet 1 Sig: Take 1 Tab by mouth daily. Class: Normal  
 Pharmacy: [facility] [address] SAM CARRASQUILLO [address], [facility] [address] [facility] [address] Ph #: [phone] Route: Oral  
  
Follow-up Instructions Return if symptoms worsen or fail to improve. Introducing [facility]! Romayne Duster introduces [employer] patient portal. Now you can access parts of your medical record, email your doctor's office, and request medication refills online. 1. In your internet browser, go to [email or web address] 2. Click on the First Time User? Click Here link in the Sign In box. You will see the New Member Sign Up page. 3. Enter your [employer] Access Code exactly as it appears below. You will not need to use this code after youve completed the sign-up process. If you do not sign up before the expiration date, you must request a new code. · [employer] Access Code: [email or web address] Expires: 12/23/2018  4:17 PM 
 
 4. Enter the last four digits of your Social Security Number (xxxx) and Date of Birth (mm/dd/yyyy) as indicated and click Submit. You will be taken to the next sign-up page. 5. Create a [employer]com ID. This will be your [employer]com login ID and cannot be changed, so think of one that is secure and easy to remember. 6. Create a [employer] password. You can change your password at any time. 7. Enter your Password Reset Question and Answer. This can be used at a later time if you forget your password. 8. Enter your e-mail address. You will receive e-mail notification when new information is available in [address] 9. Click Sign Up. You can now view and download portions of your medical record. 10. Click the Download Summary menu link to download a portable copy of your medical information. If you have questions, please visit the Frequently Asked Questions section of the [email or web address] website. Remember, [employer]com is NOT to be used for urgent needs. For medical emergencies, dial 911. Now available from your iPhone and Android! Please provide this summary of care documentation to your next provider. Your primary care clinician is listed as Jimmy Dsouza. If you have any questions after today's visit, please call [phone].

## 2018-09-24 NOTE — PROGRESS NOTES
Chief Complaint   Patient presents with    Follow-up     Anxiety. Pt states that there has been an improvement.  Pt states that she is no longer depressed

## 2018-09-27 NOTE — PROGRESS NOTES
26year old female here for followup of anxiety    I reviewed with the resident the medical history and the resident's findings on the physical examination. I discussed with the resident the patient's diagnosis and concur with the plan.

## 2018-10-30 DIAGNOSIS — F33.2 SEVERE EPISODE OF RECURRENT MAJOR DEPRESSIVE DISORDER, WITHOUT PSYCHOTIC FEATURES (HCC): ICD-10-CM

## 2018-10-30 DIAGNOSIS — F41.1 GAD (GENERALIZED ANXIETY DISORDER): ICD-10-CM

## 2018-10-31 RX ORDER — TRAZODONE HYDROCHLORIDE 150 MG/1
150 TABLET ORAL
Qty: 90 TAB | Refills: 1 | Status: SHIPPED | OUTPATIENT
Start: 2018-10-31 | End: 2019-10-30 | Stop reason: SDUPTHER

## 2019-07-19 ENCOUNTER — APPOINTMENT (OUTPATIENT)
Dept: CT IMAGING | Age: 34
End: 2019-07-19
Attending: EMERGENCY MEDICINE
Payer: MEDICAID

## 2019-07-19 ENCOUNTER — HOSPITAL ENCOUNTER (EMERGENCY)
Age: 34
Discharge: HOME | End: 2019-07-20
Attending: EMERGENCY MEDICINE
Payer: MEDICAID

## 2019-07-19 DIAGNOSIS — M54.12 CERVICAL RADICULOPATHY: ICD-10-CM

## 2019-07-19 DIAGNOSIS — M54.2 NECK PAIN: Primary | ICD-10-CM

## 2019-07-19 PROCEDURE — 96372 THER/PROPH/DIAG INJ SC/IM: CPT

## 2019-07-19 PROCEDURE — 74011250636 HC RX REV CODE- 250/636: Performed by: EMERGENCY MEDICINE

## 2019-07-19 PROCEDURE — 99283 EMERGENCY DEPT VISIT LOW MDM: CPT

## 2019-07-19 PROCEDURE — 72125 CT NECK SPINE W/O DYE: CPT

## 2019-07-19 PROCEDURE — 74011250637 HC RX REV CODE- 250/637: Performed by: EMERGENCY MEDICINE

## 2019-07-19 RX ORDER — HYDROCODONE BITARTRATE AND ACETAMINOPHEN 5; 325 MG/1; MG/1
1 TABLET ORAL
Status: COMPLETED | OUTPATIENT
Start: 2019-07-19 | End: 2019-07-19

## 2019-07-19 RX ORDER — KETOROLAC TROMETHAMINE 30 MG/ML
60 INJECTION, SOLUTION INTRAMUSCULAR; INTRAVENOUS
Status: COMPLETED | OUTPATIENT
Start: 2019-07-19 | End: 2019-07-19

## 2019-07-19 RX ORDER — NAPROXEN 500 MG/1
500 TABLET ORAL
Qty: 20 TAB | Refills: 0 | Status: SHIPPED | OUTPATIENT
Start: 2019-07-19 | End: 2022-05-11 | Stop reason: ALTCHOICE

## 2019-07-19 RX ORDER — LIDOCAINE 4 G/100G
PATCH TOPICAL
Qty: 8 PATCH | Refills: 0 | Status: SHIPPED | OUTPATIENT
Start: 2019-07-19 | End: 2022-05-23

## 2019-07-19 RX ORDER — DIAZEPAM 5 MG/1
5 TABLET ORAL
Status: COMPLETED | OUTPATIENT
Start: 2019-07-19 | End: 2019-07-19

## 2019-07-19 RX ORDER — CYCLOBENZAPRINE HCL 10 MG
10 TABLET ORAL
Qty: 12 TAB | Refills: 0 | Status: SHIPPED | OUTPATIENT
Start: 2019-07-19 | End: 2019-07-21

## 2019-07-19 RX ORDER — METHYLPREDNISOLONE 4 MG/1
TABLET ORAL
Qty: 1 DOSE PACK | Refills: 0 | Status: SHIPPED | OUTPATIENT
Start: 2019-07-19 | End: 2019-07-19

## 2019-07-19 RX ADMIN — KETOROLAC TROMETHAMINE 60 MG: 30 INJECTION, SOLUTION INTRAMUSCULAR at 23:07

## 2019-07-19 RX ADMIN — DIAZEPAM 5 MG: 5 TABLET ORAL at 23:07

## 2019-07-19 RX ADMIN — HYDROCODONE BITARTRATE AND ACETAMINOPHEN 1 TABLET: 5; 325 TABLET ORAL at 23:58

## 2019-07-20 VITALS
BODY MASS INDEX: 36.8 KG/M2 | WEIGHT: 200 LBS | SYSTOLIC BLOOD PRESSURE: 154 MMHG | HEIGHT: 62 IN | RESPIRATION RATE: 16 BRPM | HEART RATE: 61 BPM | OXYGEN SATURATION: 100 % | TEMPERATURE: 98.4 F | DIASTOLIC BLOOD PRESSURE: 79 MMHG

## 2019-07-20 NOTE — ED TRIAGE NOTES
\"I do not know what happened to me, but I cannot turn my neck. I'm having pain shooting down my right arm and down my right shoulder blade. Just a few minutes ago it started shooting over to my left shoulder. \" Pt has history of ORIF to right shoulder after fracture, with hardware removal last year after multiple complications.

## 2019-07-20 NOTE — ED PROVIDER NOTES
26 y.o. female with past medical history significant for gallstones s/p cholecystectomy who presents via private vehicle from home accompanied by a family member with chief complaint of neck pain. Patient arrives stating \"I can't move my neck, it hurts so bad\", further c/o acute onset right-sided neck pain worse with neck flexion and \"shooting\" down her right arm and right shoulder blade x5 hours PTA. No recent injuries or strenuous activities. Patient endorses Hx of similar symptoms across both shoulder blades in the past - was told of possible nerve damage to that area. Patient also endorses Hx of 2 right arm surgeries wherein she had a plate and 12 screws placed and removed within the last year. Patient also c/o recent headaches. No SOB or chest pain, but notes her pain sometimes radiates to her collarbone region. Of note, patient takes daily celexa and klonopin. Denies bowel/bladder incontinence or saddle anesthesia. There are no other acute medical concerns at this time. Social hx: Former tobacco smoker; Endorses occasional EtOH use; Denies illicit drug use  PCP: None    Note written by Janessa Gambino, as dictated by Ira Avila MD 10:59 PM           Past Medical History:   Diagnosis Date    Gall stones 2014    Heel spur, right 2017       Past Surgical History:   Procedure Laterality Date    HX CHOLECYSTECTOMY      HX GI  9/16/14    gallbladder    HX HEENT      T&A    HX ORTHOPAEDIC  1994    left arm surgery.     HX ORTHOPAEDIC  2015;2017    fx right humerous; plate and 12 screws removed from humerous    HX TONSILLECTOMY  1996         Family History:   Problem Relation Age of Onset    No Known Problems Mother     No Known Problems Father        Social History     Socioeconomic History    Marital status: SINGLE     Spouse name: Not on file    Number of children: Not on file    Years of education: Not on file    Highest education level: Not on file   Occupational History    Not on file   Social Needs    Financial resource strain: Not on file    Food insecurity:     Worry: Not on file     Inability: Not on file    Transportation needs:     Medical: Not on file     Non-medical: Not on file   Tobacco Use    Smoking status: Former Smoker    Smokeless tobacco: Never Used    Tobacco comment: vapes   Substance and Sexual Activity    Alcohol use: Yes     Alcohol/week: 0.0 standard drinks     Comment: occasionally    Drug use: No    Sexual activity: Yes     Partners: Male     Birth control/protection: Condom   Lifestyle    Physical activity:     Days per week: Not on file     Minutes per session: Not on file    Stress: Not on file   Relationships    Social connections:     Talks on phone: Not on file     Gets together: Not on file     Attends [employer] service: Not on file     Active member of club or organization: Not on file     Attends meetings of clubs or organizations: Not on file     Relationship status: Not on file    Intimate partner violence:     Fear of current or ex partner: Not on file     Emotionally abused: Not on file     Physically abused: Not on file     Forced sexual activity: Not on file   Other Topics Concern    Not on file   Social History Narrative    Not on file         ALLERGIES: Patient has no known allergies. Review of Systems   Respiratory: Negative for shortness of breath. Cardiovascular: Negative for chest pain. Musculoskeletal: Positive for neck pain (right-sided, radiating down her right arm and right shoulder blade). Neurological: Positive for headaches. All other systems reviewed and are negative.       Vitals:    07/19/19 2244   BP: 171/79   Pulse: 79   Resp: 18   Temp: 98.6 °F (37 °C)   SpO2: 97%   Weight: 90.7 kg (200 lb)   Height: 5' 2\" (1.575 m)            Physical Exam   Physical Examination: General appearance - alert, well appearing, and in no distress, oriented to person, place, and time and normal appearing weight  Eyes - pupils equal and reactive, extraocular eye movements intact  Neck - supple, no significant adenopathy, tenderness to lateral right neck and right upper back/trapezius, pain with rom of right arm  Chest - clear to auscultation, no wheezes, rales or rhonchi, symmetric air entry  Heart - normal rate, regular rhythm, normal S1, S2, no murmurs, rubs, clicks or gallops  Abdomen - soft, nontender, nondistended, no masses or organomegaly  Back exam - full range of motion, no tenderness, palpable spasm or pain on motion  Neurological - alert, oriented, normal speech, no focal findings or movement disorder noted  Musculoskeletal - no joint tenderness, deformity or swelling  Extremities - peripheral pulses normal, no pedal edema, no clubbing or cyanosis, RUE with well healed surgical scar to proximal anterior humerus, NVI RUE with strong radial pulses  Skin - normal coloration and turgor, no rashes, no suspicious skin lesions noted  MDM  Number of Diagnoses or Management Options  Cervical radiculopathy:   Neck pain:      Amount and/or Complexity of Data Reviewed  Clinical lab tests: ordered and reviewed  Tests in the radiology section of CPT®: ordered and reviewed  Decide to obtain previous medical records or to obtain history from someone other than the patient: yes  Review and summarize past medical records: yes  Independent visualization of images, tracings, or specimens: yes    Patient Progress  Patient progress: improved         Procedures    CT without acute process. Pt afebrile, nontoxic, VSS. Will d/c with ortho f/u. Pt is on steroids at this time for foot pain, continue steroids as prescribed. Return to ED for worsening symptoms.

## 2019-07-20 NOTE — DISCHARGE INSTRUCTIONS
Patient Education        Neck Pain: Care Instructions  Your Care Instructions    You can have neck pain anywhere from the bottom of your head to the top of your shoulders. It can spread to the upper back or arms. Injuries, painting a ceiling, sleeping with your neck twisted, staying in one position for too long, and many other activities can cause neck pain. Most neck pain gets better with home care. Your doctor may recommend medicine to relieve pain or relax your muscles. He or she may suggest exercise and physical therapy to increase flexibility and relieve stress. You may need to wear a special (cervical) collar to support your neck for a day or two. Follow-up care is a key part of your treatment and safety. Be sure to make and go to all appointments, and call your doctor if you are having problems. It's also a good idea to know your test results and keep a list of the medicines you take. How can you care for yourself at home? · Try using a heating pad on a low or medium setting for 15 to 20 minutes every 2 or 3 hours. Try a warm shower in place of one session with the heating pad. · You can also try an ice pack for 10 to 15 minutes every 2 to 3 hours. Put a thin cloth between the ice and your skin. · Take pain medicines exactly as directed. ¨ If the doctor gave you a prescription medicine for pain, take it as prescribed. ¨ If you are not taking a prescription pain medicine, ask your doctor if you can take an over-the-counter medicine. · If your doctor recommends a cervical collar, wear it exactly as directed. When should you call for help? Call your doctor now or seek immediate medical care if:  ? · You have new or worsening numbness in your arms, buttocks or legs. ? · You have new or worsening weakness in your arms or legs. (This could make it hard to stand up.)   ? · You lose control of your bladder or bowels. ? Watch closely for changes in your health, and be sure to contact your doctor if:  ? · Your neck pain is getting worse. ? · You are not getting better after 1 week. ? · You do not get better as expected. Where can you learn more? Go to http://christian-jong.info/. Enter [id number] in the search box to learn more about \"Neck Pain: Care Instructions. \"  Current as of: March 21, 2017  Content Version: 11.5  © 9321-8096 [employer]. Care instructions adapted under license by [employer] (which disclaims liability or warranty for this information). If you have questions about a medical condition or this instruction, always ask your healthcare professional. [place] [address] any warranty or liability for your use of this information. Patient Education        Pinched Nerve in the Neck: Care Instructions  Your Care Instructions  A pinched nerve in the neck happens when a vertebra or disc in the upper part of your spine is damaged. This damage can happen because of an injury. Or it can just happen with age. The changes caused by the damage may put pressure on a nearby nerve root, pinching it. This causes symptoms such as sharp pain in your neck, shoulder, arm, hand, or back. You may also have tingling or numbness. Sometimes it makes your arm weaker. The symptoms are usually worse when you turn your head or strain your neck. For many people, the symptoms get better over time and finally go away. Early treatment usually includes medicines for pain and swelling. Sometimes physical therapy and special exercises may help. Follow-up care is a key part of your treatment and safety. Be sure to make and go to all appointments, and call your doctor if you are having problems. It's also a good idea to know your test results and keep a list of the medicines you take. How can you care for yourself at home? · Be safe with medicines. Read and follow all instructions on the label.   ¨ If the doctor gave you a prescription medicine for pain, take it as prescribed. ¨ If you are not taking a prescription pain medicine, ask your doctor if you can take an over-the-counter medicine. · Try using a heating pad on a low or medium setting for 15 to 20 minutes every 2 or 3 hours. Try a warm shower in place of one session with the heating pad. You can also buy single-use heat wraps that last up to 8 hours. · You can also try an ice pack for 10 to 15 minutes every 2 to 3 hours. There isn't strong evidence that either heat or ice will help. But you can try them to see if they help you. · Don't spend too long in one position. Take short breaks to move around and change positions. · Wear a seat belt and shoulder harness when you are in a car. · Sleep with a pillow under your head and neck that keeps your neck straight. · If you were given a neck brace (cervical collar) to limit neck motion, wear it as instructed for as many days as your doctor tells you to. Do not wear it longer than you were told to. Wearing a brace for too long can lead to neck stiffness and can weaken the neck muscles. · Follow your doctor's instructions for gentle neck-stretching exercises. · Do not smoke. Smoking can slow healing of your discs. If you need help quitting, talk to your doctor about stop-smoking programs and medicines. These can increase your chances of quitting for good. · Avoid strenuous work or exercise until your doctor says it is okay. When should you call for help? Call 911 anytime you think you may need emergency care. For example, call if:  ? · You are unable to move an arm or a leg at all. ?Call your doctor now or seek immediate medical care if:  ? · You have new or worse symptoms in your arms, legs, chest, belly, or buttocks. Symptoms may include:  ¨ Numbness or tingling. ¨ Weakness. ¨ Pain. ? · You lose bladder or bowel control. ? Watch closely for changes in your health, and be sure to contact your doctor if:  ? · You are not getting better as expected.    Where can you learn more? Go to http://christian-jong.info/. Enter B980 in the search box to learn more about \"Pinched Nerve in the Neck: Care Instructions. \"  Current as of: March 21, 2017  Content Version: 11.5  © 2533-5572 [employer]. Care instructions adapted under license by [employer] (which disclaims liability or warranty for this information). If you have questions about a medical condition or this instruction, always ask your healthcare professional. [place] [address] any warranty or liability for your use of this information. Patient Education        Neck: Exercises  Introduction  Here are some examples of exercises for you to try. The exercises may be suggested for a condition or for rehabilitation. Start each exercise slowly. Ease off the exercises if you start to have pain. You will be told when to start these exercises and which ones will work best for you. How to do the exercises  Neck stretch    1. This stretch works best if you keep your shoulder down as you lean away from it. To help you remember to do this, start by relaxing your shoulders and lightly holding on to your thighs or your chair. 2. Tilt your head toward your shoulder and hold for 15 to 30 seconds. Let the weight of your head stretch your muscles. 3. If you would like a little added stretch, use your hand to gently and steadily pull your head toward your shoulder. For example, keeping your right shoulder down, lean your head to the left. 4. Repeat 2 to 4 times toward each shoulder. Diagonal neck stretch    1. Turn your head slightly toward the direction you will be stretching, and tilt your head diagonally toward your chest and hold for 15 to 30 seconds. 2. If you would like a little added stretch, use your hand to gently and steadily pull your head forward on the diagonal.  3. Repeat 2 to 4 times toward each side. Dorsal glide stretch    1.  Sit or stand tall and look straight ahead. 2. Slowly tuck your chin as you glide your head backward over your body  3. Hold for a count of 6, and then relax for up to 10 seconds. 4. Repeat 8 to 12 times. Chest and shoulder stretch    1. Sit or stand tall and glide your head backward as in the dorsal glide stretch. 2. Raise both arms so that your hands are next to your ears. 3. Take a deep breath, and as you breathe out, lower your elbows down and behind your back. You will feel your shoulder blades slide down and together, and at the same time you will feel a stretch across your chest and the front of your shoulders. 4. Hold for about 6 seconds, and then relax for up to 10 seconds. 5. Repeat 8 to 12 times. Strengthening: Hands on head    1. Move your head backward, forward, and side to side against gentle pressure from your hands, holding each position for about 6 seconds. 2. Repeat 8 to 12 times. Follow-up care is a key part of your treatment and safety. Be sure to make and go to all appointments, and call your doctor if you are having problems. It's also a good idea to know your test results and keep a list of the medicines you take. Where can you learn more? Go to http://christian-jong.info/. Enter P975 in the search box to learn more about \"Neck: Exercises. \"  Current as of: September 20, 2018  Content Version: 12.1  © 5941-6294 [employer]. Care instructions adapted under license by [employer] (which disclaims liability or warranty for this information). If you have questions about a medical condition or this instruction, always ask your healthcare professional. [address] any warranty or liability for your use of this information. We hope that we have addressed all of your medical concerns. The examination and treatment you received in the Emergency Department were for an emergent problem and were not intended as complete care.  It is important that you follow up with your healthcare provider(s) for ongoing care. If your symptoms worsen or do not improve as expected, and you are unable to reach your usual health care provider(s), you should return to the Emergency Department. Today's healthcare is undergoing tremendous change, and patient satisfaction surveys are one of the many tools to assess the quality of medical care. You may receive a survey from the [employer] organization regarding your experience in the Emergency Department. I hope that your experience has been completely positive, particularly the medical care that I provided. As such, please participate in the survey; anything less than excellent does not meet my expectations or intentions. [address] [facility] and 8 [facility] participate in nationally recognized quality of care measures. If your blood pressure is greater than 120/80, as reported below, we urge that you seek medical care to address the potential of high blood pressure, commonly known as hypertension. Hypertension can be hereditary or can be caused by certain medical conditions, pain, stress, or \"white coat syndrome. \"       Please make an appointment with your health care provider(s) for follow up of your Emergency Department visit. VITALS:   Patient Vitals for the past 8 hrs:   Temp Pulse Resp BP SpO2   07/19/19 2244 98.6 °F (37 °C) 79 18 171/79 97 %          Thank you for allowing us to provide you with medical care today. We realize that you have many choices for your emergency care needs. Please choose us in the future for any continued health care needs. Rhett Alberts [facility], [address] [facility] [place]: [phone]            No results found for this or any previous visit (from the past 24 hour(s)).     Ct Spine Cerv Wo Cont    Result Date: 7/19/2019  EXAM:  CT CERVICAL SPINE WITHOUT CONTRAST INDICATION: neck pain radiating down right arm, limited range of motion. No injury. COMPARISON: None. CONTRAST:  None. TECHNIQUE: Multislice helical CT of the cervical spine was performed without intravenous contrast administration. Sagittal and coronal reconstructions were generated. CT dose reduction was achieved through use of a standardized protocol tailored for this examination and automatic exposure control for dose modulation. FINDINGS: Vertical straightening is positional versus muscle spasm. There is no fracture or compression deformity. The odontoid process is intact. The craniocervical junction is within normal limits. The prevertebral soft tissues are within normal limits. C2-C3: There is no spinal canal or neural foraminal stenosis. C3-C4: There is no spinal canal or neural foraminal stenosis. C4-C5: There is no spinal canal or neural foraminal stenosis. C5-C6: There is no spinal canal or neural foraminal stenosis. C6-C7: Posterior disc osteophyte complex. Mild central spinal canal stenosis. C7-T1: There is no spinal canal or neural foraminal stenosis. IMPRESSION: 1. No fracture. 2. C6-C7 mild central spinal canal stenosis.

## 2019-07-21 ENCOUNTER — HOSPITAL ENCOUNTER (EMERGENCY)
Age: 34
Discharge: HOME | End: 2019-07-21
Attending: EMERGENCY MEDICINE
Payer: MEDICAID

## 2019-07-21 VITALS
HEART RATE: 99 BPM | HEIGHT: 62 IN | OXYGEN SATURATION: 98 % | BODY MASS INDEX: 36.8 KG/M2 | WEIGHT: 200 LBS | DIASTOLIC BLOOD PRESSURE: 78 MMHG | SYSTOLIC BLOOD PRESSURE: 160 MMHG | RESPIRATION RATE: 18 BRPM | TEMPERATURE: 99.5 F

## 2019-07-21 DIAGNOSIS — M54.12 CERVICAL RADICULOPATHY: Primary | ICD-10-CM

## 2019-07-21 PROCEDURE — 99282 EMERGENCY DEPT VISIT SF MDM: CPT

## 2019-07-21 PROCEDURE — 74011250637 HC RX REV CODE- 250/637: Performed by: PHYSICIAN ASSISTANT

## 2019-07-21 PROCEDURE — 74011250636 HC RX REV CODE- 250/636: Performed by: PHYSICIAN ASSISTANT

## 2019-07-21 PROCEDURE — 96372 THER/PROPH/DIAG INJ SC/IM: CPT

## 2019-07-21 RX ORDER — TRAMADOL HYDROCHLORIDE 50 MG/1
50 TABLET ORAL
Status: COMPLETED | OUTPATIENT
Start: 2019-07-21 | End: 2019-07-21

## 2019-07-21 RX ORDER — DEXAMETHASONE SODIUM PHOSPHATE 10 MG/ML
10 INJECTION INTRAMUSCULAR; INTRAVENOUS
Status: COMPLETED | OUTPATIENT
Start: 2019-07-21 | End: 2019-07-21

## 2019-07-21 RX ORDER — HYDROCODONE BITARTRATE AND ACETAMINOPHEN 5; 325 MG/1; MG/1
1 TABLET ORAL
Qty: 10 TAB | Refills: 0 | Status: SHIPPED | OUTPATIENT
Start: 2019-07-21 | End: 2019-07-24

## 2019-07-21 RX ORDER — DEXAMETHASONE SODIUM PHOSPHATE 10 MG/ML
10 INJECTION INTRAMUSCULAR; INTRAVENOUS
Status: DISCONTINUED | OUTPATIENT
Start: 2019-07-21 | End: 2019-07-21

## 2019-07-21 RX ORDER — METHOCARBAMOL 500 MG/1
500 TABLET, FILM COATED ORAL 4 TIMES DAILY
Qty: 30 TAB | Refills: 0 | Status: SHIPPED | OUTPATIENT
Start: 2019-07-21 | End: 2019-07-31

## 2019-07-21 RX ADMIN — TRAMADOL HYDROCHLORIDE 50 MG: 50 TABLET, FILM COATED ORAL at 17:45

## 2019-07-21 RX ADMIN — DEXAMETHASONE SODIUM PHOSPHATE 10 MG: 10 INJECTION, SOLUTION INTRAMUSCULAR; INTRAVENOUS at 17:49

## 2019-07-21 NOTE — ED TRIAGE NOTES
Patient here on 7/16 for neck pain, states \" none of the medication that they gave me have worked and it's getting worse and moving into my arms. \"

## 2019-07-22 NOTE — ED PROVIDER NOTES
Stevie Lopez is a 26 y.o. female  who presents by private vehicle to ER with c/o Patient presents with:  Neck Pain  Patient presents with neck pain since 7/16, was seen in ED at this time and put on medications. Patient reports the medications at home are not helping. Denies any new injury. Patient reports pain with left arm movement. Patient denies following up with orthopedic. She specifically denies any fevers, chills, nausea, vomiting, chest pain, shortness of breath, headache, rash, diarrhea, abdominal pain, urinary/bowel changes, sweating or weight loss. PCP: None   PMHx significant for: Past Medical History:  2014: Adolm Jabs stones  2017: Heel spur, right   PSHx significant for: Past Surgical History:  No date: HX CHOLECYSTECTOMY  9/16/14: HX GI      Comment:  gallbladder  No date: HX HEENT      Comment:  T&A  1994: HX ORTHOPAEDIC      Comment:  left arm surgery. 2015;2017: HX ORTHOPAEDIC      Comment:  fx right humerous; plate and 12 screws removed from                humerous  1996: HX TONSILLECTOMY  Social Hx: Tobacco use: Social History    Tobacco Use      Smoking status: Former Smoker      Smokeless tobacco: Never Used      Tobacco comment: vapes  ; EtOH use: The patient states she drinks 0. per week.; Illicit Drug use: Allergies:  No Known Allergies    There are no other complaints, changes or physical findings at this time. Past Medical History:   Diagnosis Date    Gall stones 2014    Heel spur, right 2017       Past Surgical History:   Procedure Laterality Date    HX CHOLECYSTECTOMY      HX GI  9/16/14    gallbladder    HX HEENT      T&A    HX ORTHOPAEDIC  1994    left arm surgery.     HX ORTHOPAEDIC  2015;2017    fx right humerous; plate and 12 screws removed from humerous    HX TONSILLECTOMY  1996         Family History:   Problem Relation Age of Onset    No Known Problems Mother     No Known Problems Father        Social History     Socioeconomic History    Marital status: SINGLE     Spouse name: Not on file    Number of children: Not on file    Years of education: Not on file    Highest education level: Not on file   Occupational History    Not on file   Social Needs    Financial resource strain: Not on file    Food insecurity:     Worry: Not on file     Inability: Not on file    Transportation needs:     Medical: Not on file     Non-medical: Not on file   Tobacco Use    Smoking status: Former Smoker    Smokeless tobacco: Never Used    Tobacco comment: vapes   Substance and Sexual Activity    Alcohol use: Yes     Alcohol/week: 0.0 standard drinks     Comment: occasionally    Drug use: No    Sexual activity: Yes     Partners: Male     Birth control/protection: Condom   Lifestyle    Physical activity:     Days per week: Not on file     Minutes per session: Not on file    Stress: Not on file   Relationships    Social connections:     Talks on phone: Not on file     Gets together: Not on file     Attends Religion service: Not on file     Active member of club or organization: Not on file     Attends meetings of clubs or organizations: Not on file     Relationship status: Not on file    Intimate partner violence:     Fear of current or ex partner: Not on file     Emotionally abused: Not on file     Physically abused: Not on file     Forced sexual activity: Not on file   Other Topics Concern    Not on file   Social History Narrative    Not on file         ALLERGIES: Patient has no known allergies. Review of Systems   Constitutional: Negative for activity change, chills and fever. HENT: Negative for congestion, rhinorrhea and sore throat. Respiratory: Negative for shortness of breath. Cardiovascular: Negative for chest pain and leg swelling. Gastrointestinal: Negative for abdominal pain, diarrhea, nausea and vomiting. Genitourinary: Negative for dysuria, vaginal bleeding and vaginal discharge.    Musculoskeletal: Positive for myalgias and neck pain. Negative for arthralgias. Neurological: Negative for dizziness. Psychiatric/Behavioral: Negative for confusion. All other systems reviewed and are negative. Vitals:    07/21/19 1533   BP: 160/78   Pulse: 99   Resp: 18   Temp: 99.5 °F (37.5 °C)   SpO2: 98%   Weight: 90.7 kg (200 lb)   Height: 5' 2\" (1.575 m)            Physical Exam   Constitutional: She is oriented to person, place, and time. She appears well-developed. HENT:   Head: Normocephalic and atraumatic. Right Ear: External ear normal.   Left Ear: External ear normal.   Nose: Nose normal.   Mouth/Throat: Oropharynx is clear and moist. No oropharyngeal exudate. Eyes: Conjunctivae, EOM and lids are normal. Right eye exhibits no discharge. Left eye exhibits no discharge. Neck: Normal range of motion. No tracheal deviation present. No thyromegaly present. Cardiovascular: Normal rate, regular rhythm, normal heart sounds and intact distal pulses. Pulmonary/Chest: Effort normal and breath sounds normal.   Abdominal: Soft. Normal appearance and bowel sounds are normal.   Musculoskeletal: Normal range of motion. Back:    Spine palpated with out step off or crepitus. Non-TTP over spine. Full ROM to all extremities. All extremities are NVI. Patient ambulatory to exam room with out difficulty. Neurological: She is alert and oriented to person, place, and time. Skin: Skin is warm and dry. Psychiatric: She has a normal mood and affect. Judgment normal.        MDM  Number of Diagnoses or Management Options  Cervical radiculopathy:   Diagnosis management comments: Assesment/Plan- 26 y.o. Patient presents with:  Neck Pain  differential includes: cervical radiculopathy, muscle strain, pathological fracture. PE findings consistent with cervical radiculopathy, patient with recent CT. Recommend Ortho follow up. Patient educated on reasons to return to the ED.            Procedures

## 2019-10-29 ENCOUNTER — OFFICE VISIT (OUTPATIENT)
Dept: FAMILY MEDICINE CLINIC | Age: 34
End: 2019-10-29

## 2019-10-29 VITALS
RESPIRATION RATE: 18 BRPM | WEIGHT: 230 LBS | DIASTOLIC BLOOD PRESSURE: 78 MMHG | HEIGHT: 62 IN | SYSTOLIC BLOOD PRESSURE: 142 MMHG | OXYGEN SATURATION: 96 % | TEMPERATURE: 99.4 F | HEART RATE: 94 BPM | BODY MASS INDEX: 42.33 KG/M2

## 2019-10-29 DIAGNOSIS — R05.9 COUGH: Primary | ICD-10-CM

## 2019-10-29 DIAGNOSIS — J18.9 PNEUMONIA OF LEFT LOWER LOBE DUE TO INFECTIOUS ORGANISM: ICD-10-CM

## 2019-10-29 DIAGNOSIS — R07.89 CHEST TIGHTNESS: ICD-10-CM

## 2019-10-29 LAB
QUICKVUE INFLUENZA TEST: NEGATIVE
S PYO AG THROAT QL: NEGATIVE
VALID INTERNAL CONTROL?: YES
VALID INTERNAL CONTROL?: YES

## 2019-10-29 RX ORDER — AZITHROMYCIN 250 MG/1
TABLET, FILM COATED ORAL
Qty: 6 TAB | Refills: 0 | Status: SHIPPED | OUTPATIENT
Start: 2019-10-29 | End: 2022-05-23

## 2019-10-29 RX ORDER — GUAIFENESIN 600 MG/1
600 TABLET, EXTENDED RELEASE ORAL 2 TIMES DAILY
Qty: 30 TAB | Refills: 0 | Status: SHIPPED | OUTPATIENT
Start: 2019-10-29 | End: 2022-05-23

## 2019-10-29 NOTE — PATIENT INSTRUCTIONS
Bronchitis: Care Instructions  Your Care Instructions    Bronchitis is inflammation of the bronchial tubes, which carry air to the lungs. The tubes swell and produce mucus, or phlegm. The mucus and inflamed bronchial tubes make you cough. You may have trouble breathing. Most cases of bronchitis are caused by viruses like those that cause colds. Antibiotics usually do not help and they may be harmful. Bronchitis usually develops rapidly and lasts about 2 to 3 weeks in otherwise healthy people. Follow-up care is a key part of your treatment and safety. Be sure to make and go to all appointments, and call your doctor if you are having problems. It's also a good idea to know your test results and keep a list of the medicines you take. How can you care for yourself at home? · Take all medicines exactly as prescribed. Call your doctor if you think you are having a problem with your medicine. · Get some extra rest.  · Take an over-the-counter pain medicine, such as acetaminophen (Tylenol), ibuprofen (Advil, Motrin), or naproxen (Aleve) to reduce fever and relieve body aches. Read and follow all instructions on the label. · Do not take two or more pain medicines at the same time unless the doctor told you to. Many pain medicines have acetaminophen, which is Tylenol. Too much acetaminophen (Tylenol) can be harmful. · Take an over-the-counter cough medicine that contains dextromethorphan to help quiet a dry, hacking cough so that you can sleep. Avoid cough medicines that have more than one active ingredient. Read and follow all instructions on the label. · Breathe moist air from a humidifier, hot shower, or sink filled with hot water. The heat and moisture will thin mucus so you can cough it out. · Do not smoke. Smoking can make bronchitis worse. If you need help quitting, talk to your doctor about stop-smoking programs and medicines. These can increase your chances of quitting for good.   When should you call for help? Call 911 anytime you think you may need emergency care. For example, call if:    · You have severe trouble breathing.    Call your doctor now or seek immediate medical care if:    · You have new or worse trouble breathing.     · You cough up dark brown or bloody mucus (sputum).     · You have a new or higher fever.     · You have a new rash.    Watch closely for changes in your health, and be sure to contact your doctor if:    · You cough more deeply or more often, especially if you notice more mucus or a change in the color of your mucus.     · You are not getting better as expected. Where can you learn more? Go to http://christian-jong.info/. Enter H333 in the search box to learn more about \"Bronchitis: Care Instructions. \"  Current as of: June 9, 2019  Content Version: 12.2  © 1586-5617 [employer]. Care instructions adapted under license by [employer] (which disclaims liability or warranty for this information). If you have questions about a medical condition or this instruction, always ask your healthcare professional. [address] any warranty or liability for your use of this information.

## 2019-10-29 NOTE — PROGRESS NOTES
Chief Complaint   Patient presents with    Cough    Chest Pain   :    HPI  Desiree Brar is a 37 y.o. female who presents for a progressive onset of cough for 8 days and stable. the context: diagnosed with pneumonia at [address] [place] [address] on 10/25/19. She has been treated with Amoxicillin 875 mg tid for 5 days. Completed 4/5 days of treatment guafeisin-codeine  Cough is described as dry to wet, nothing coming up. Associated symptoms are chest tightness described as sharp with deep breath- no radiation, but  She feels lightheadedness, SOB. Tmax was 101F Fever resolved now Patient denies fever, chills, body aches  N/V/ diarrhea. No h/o personal CAD or in the family. 2 cigars a week. Allergies - reviewed:   No Known Allergies    Meds - reviewed:   Current Outpatient Medications   Medication Sig Dispense Refill    azithromycin (ZITHROMAX) 250 mg tablet 2 tablet the first day then 1 tablet daily for 4 days 6 Tab 0    traZODone (DESYREL) 150 mg tablet Take 1 Tab by mouth nightly. 90 Tab 1    citalopram (CELEXA) 20 mg tablet Take 1 Tab by mouth daily. 90 Tab 1    clonazePAM (KLONOPIN) 0.5 mg tablet Take 0.5 Tabs by mouth nightly as needed. Max Daily Amount: 0.25 mg. 7 Tab 0    levonorgestrel (MIRENA) 20 mcg/24 hr (5 years) IUD 1 each by IntraUTERine route once.  naproxen (NAPROSYN) 500 mg tablet Take 1 Tab by mouth every twelve (12) hours as needed for Pain. 20 Tab 0    lidocaine (LIDOCAINE PAIN RELIEF) 4 % patch Apply BID PRN pain 8 Patch 0    doxylamine succinate (UNISOM) 25 mg tablet Take 25 mg by mouth nightly as needed for Sleep.  diclofenac potassium (CATAFLAM) 50 mg tablet Take 50 mg by mouth three (3) times daily.          PMH- reviewed:  Past Medical History:   Diagnosis Date    Gall stones 2014    Heel spur, right 2017       SH- reviewed:  Smoker:  Social History     Tobacco Use   Smoking Status Former Smoker   Smokeless Tobacco Never Used   Tobacco Comment    vapes       ETOH- reviewed:   Social History     Substance and Sexual Activity   Alcohol Use Yes    Alcohol/week: 0.0 standard drinks    Comment: occasionally       FH- reviewed:   Family History   Problem Relation Age of Onset    No Known Problems Mother     No Known Problems Father          ROS: Positive for Items in bold:   Constitutional: headache, fever, fatigue, weight loss or weight gain      Eyes: redness, pruritis, pain, visual changes, swelling, or discharge      Ears: ear pain, loss or changes in hearing     Nose: congestion, rhinorrhea  Oropharynx: sore throat, lesions in throat  Cardiac: chest pain          Physical Exam:    Visit Vitals  /78 (BP 1 Location: Left arm, BP Patient Position: Sitting)   Pulse 94   Temp 99.4 °F (37.4 °C) (Oral)   Resp 18   Ht 5' 2\" (1.575 m)   Wt 230 lb (104.3 kg)   SpO2 96%   BMI 42.07 kg/m²        Gen: No apparent distress. Alert and oriented and responds to all questions appropriately. Nose: Turbinates are within normal limits. No drainage or sinus tenderness . Oropharynx: No oral lesions or exudates. Neck: Supple; no masses; no thyromegaly appreciated. No cervical LAD  Lungs: Respirations unlabored; clear to auscultation bilaterally, but deep breath will stimulate a cough spell. Cardio: Regular, rate, and rhythm without murmurs, gallops or rubs   Neuro: no focal finding.        Lab Results   Component Value Date/Time    Sodium 143 09/26/2014 04:21 PM    Potassium 4.0 09/26/2014 04:21 PM    Chloride 104 09/26/2014 04:21 PM    CO2 23 09/26/2014 04:21 PM    Glucose 97 09/26/2014 04:21 PM    BUN 15 09/26/2014 04:21 PM    Creatinine 0.49 (L) 09/26/2014 04:21 PM    BUN/Creatinine ratio 31 (H) 09/26/2014 04:21 PM    GFR est  09/26/2014 04:21 PM    GFR est non- 09/26/2014 04:21 PM    Calcium 9.4 09/26/2014 04:21 PM       I have obtained personally reviewed  Records from [address] [place] [address]   similar complaints on presentation  CTA was neg for PE but showed  Peribronchial nodular infiltrate in EMMANUEL and LLL; WBC: 12.01, CXR was neg. She was admitted on 10/25 for sepsis EMMANUEL,LLL CAP : treated with 1 dose of Ceftriaxone and Azithromycin and nebs. Patient was discharge next day on amoxicillin  Recent Results (from the past 12 hour(s))   AMB POC RAPID STREP A    Collection Time: 10/29/19 11:10 AM   Result Value Ref Range    VALID INTERNAL CONTROL POC Yes     Group A Strep Ag Negative Negative   AMB POC RAPID INFLUENZA TEST    Collection Time: 10/29/19 11:10 AM   Result Value Ref Range    VALID INTERNAL CONTROL POC Yes     QuickVue Influenza test Negative Negative       amb ECG: NSR, non specific T wave inversion in V1, possibly in V2. Assessment and Plan:   Desiree Brar is a 37 y.o. female who presents for follow for sepsis 2/2 CAP. Patient states she is feeling better compared to yesterday. I recommended going to the ER but patient refused. Strict ED precautions given. Vitals are normal. Repeat CXR is negative. prescription of Azithromycin sent to pharmacy. Patient to RTC in one day for follow up. Patient was seen and discussed with Dr. Mariann Gomez.       ICD-10-CM ICD-9-CM    1. Cough R05 786.2 AMB POC RAPID STREP A      AMB POC RAPID INFLUENZA TEST      XR CHEST PA LAT      azithromycin (ZITHROMAX) 250 mg tablet   2. Chest tightness R07.89 786.59 AMB POC EKG ROUTINE W/ 12 LEADS, INTER & REP      REFERRAL TO CARDIOLOGY   3. Pneumonia of left lower lobe due to infectious organism [facility]) J18.1 486        Discussed diagnoses in detail with patient. Patient expressed understanding of and agreement to above plan. All questions and concerns addressed. Medication risks/benefits/side effects discussed with patient. Patient is counseled to return to the office and/or ED if symptoms do not improve as expected. Patient discussed with Dr. Mariann Gomez, Attending Physician. Tomer Ortiz MD  10/29/19    Family Medicine Resident

## 2019-10-29 NOTE — PROGRESS NOTES
1. Have you been to the ER, urgent care clinic since your last visit? Hospitalized since your last visit? Yes, SARIAH JONES 10/25/19    2. Have you seen or consulted any other health care providers outside of the [address] [place] [address] since your last visit? Include any pap smears or colon screening. No    Chief Complaint   Patient presents with    Cough    Chest Pain     Patient states diagnosed with pneumonia on Friday at [place] [address]. Blood pressure 142/78, pulse 94, temperature 99.4 °F (37.4 °C), temperature source Oral, resp. rate 18, height 5' 2\" (1.575 m), weight 230 lb (104.3 kg), SpO2 96 %.

## 2019-10-30 ENCOUNTER — TELEPHONE (OUTPATIENT)
Dept: FAMILY MEDICINE CLINIC | Age: 34
End: 2019-10-30

## 2019-10-30 ENCOUNTER — OFFICE VISIT (OUTPATIENT)
Dept: FAMILY MEDICINE CLINIC | Age: 34
End: 2019-10-30

## 2019-10-30 VITALS
BODY MASS INDEX: 41.96 KG/M2 | HEIGHT: 62 IN | SYSTOLIC BLOOD PRESSURE: 121 MMHG | WEIGHT: 228 LBS | DIASTOLIC BLOOD PRESSURE: 83 MMHG | HEART RATE: 88 BPM | OXYGEN SATURATION: 97 % | TEMPERATURE: 98 F

## 2019-10-30 DIAGNOSIS — J18.9 COMMUNITY ACQUIRED PNEUMONIA OF LEFT LUNG, UNSPECIFIED PART OF LUNG: Primary | ICD-10-CM

## 2019-10-30 DIAGNOSIS — F33.2 SEVERE EPISODE OF RECURRENT MAJOR DEPRESSIVE DISORDER, WITHOUT PSYCHOTIC FEATURES (HCC): ICD-10-CM

## 2019-10-30 DIAGNOSIS — F41.1 GAD (GENERALIZED ANXIETY DISORDER): ICD-10-CM

## 2019-10-30 RX ORDER — DOXYCYCLINE 100 MG/1
100 TABLET ORAL 2 TIMES DAILY
Qty: 8 TAB | Refills: 0 | Status: SHIPPED | OUTPATIENT
Start: 2019-10-30 | End: 2019-11-03

## 2019-10-30 RX ORDER — CITALOPRAM 20 MG/1
20 TABLET, FILM COATED ORAL DAILY
Qty: 90 TAB | Refills: 1 | Status: SHIPPED | OUTPATIENT
Start: 2019-10-30 | End: 2022-05-23

## 2019-10-30 RX ORDER — TRAZODONE HYDROCHLORIDE 150 MG/1
150 TABLET ORAL
Qty: 90 TAB | Refills: 1 | Status: SHIPPED | OUTPATIENT
Start: 2019-10-30 | End: 2022-05-23 | Stop reason: SDUPTHER

## 2019-10-30 NOTE — TELEPHONE ENCOUNTER
[phone]  Patient called to say she was seen here today and the doctor was to have ordered 2 more prescriptions for her. celexa 40 mg   trazadone 150 mg. Please call her about this.

## 2019-10-30 NOTE — PROGRESS NOTES
[address] [place] [address]    Subjective:   Ian Quintana is a 37 y.o. female with history of see problem list  CC: PNA follow-up and Rash  History provided by patient and chart review    HPI:  37 yr old female presenting to clinic today for PNA follow up and rash. She was admitted at [place] [address] from October 25th to 26th for GANDHI and had Chest CT which was revealing of a Left Upper and Lower Lobe PNA. She was treated with Azithromycin and Rocephin. She was discharged on Amoxicillin 875mg TID for 5 days and completed 4/5 days of therapy. She was seen in clinic yesterday for ED follow-up and prescribed a Z-pack. Pt states she developed a right upper and LE rash after first dose of azithromycin which has since resolved after a dose of benadryl. She did not continue azithromycin due to fear of recurrence. Today, she complains of mild fatigue and malaise but feels better. She denies any associated cough, chest pain, sob, fever, chills, night sweats, nausea or vomiting. Pt also requesting refill of Clexa and Trazodone for insomnia and JOHAN. Past Medical History:   Diagnosis Date    Gall stones 2014    Heel spur, right 2017     No Known Allergies  Current Outpatient Medications on File Prior to Visit   Medication Sig Dispense Refill    azithromycin (ZITHROMAX) 250 mg tablet 2 tablet the first day then 1 tablet daily for 4 days 6 Tab 0    guaiFENesin ER (MUCINEX) 600 mg ER tablet Take 1 Tab by mouth two (2) times a day. 30 Tab 0    naproxen (NAPROSYN) 500 mg tablet Take 1 Tab by mouth every twelve (12) hours as needed for Pain. 20 Tab 0    lidocaine (LIDOCAINE PAIN RELIEF) 4 % patch Apply BID PRN pain 8 Patch 0    traZODone (DESYREL) 150 mg tablet Take 1 Tab by mouth nightly. 90 Tab 1    citalopram (CELEXA) 20 mg tablet Take 1 Tab by mouth daily. 90 Tab 1    clonazePAM (KLONOPIN) 0.5 mg tablet Take 0.5 Tabs by mouth nightly as needed.  Max Daily Amount: 0.25 mg. 7 Tab 0    doxylamine succinate (UNISOM) 25 mg tablet Take 25 mg by mouth nightly as needed for Sleep.  diclofenac potassium (CATAFLAM) 50 mg tablet Take 50 mg by mouth three (3) times daily.  levonorgestrel (MIRENA) 20 mcg/24 hr (5 years) IUD 1 each by IntraUTERine route once. No current facility-administered medications on file prior to visit. Family History   Problem Relation Age of Onset    No Known Problems Mother     No Known Problems Father      Social History     Socioeconomic History    Marital status: SINGLE     Spouse name: Not on file    Number of children: Not on file    Years of education: Not on file    Highest education level: Not on file   Tobacco Use    Smoking status: Former Smoker    Smokeless tobacco: Never Used    Tobacco comment: vapes   Substance and Sexual Activity    Alcohol use: Yes     Alcohol/week: 0.0 standard drinks     Comment: occasionally    Drug use: No    Sexual activity: Yes     Partners: Male     Birth control/protection: Condom     Past Surgical History:   Procedure Laterality Date    HX CHOLECYSTECTOMY      HX GI  9/16/14    gallbladder    HX HEENT      T&A    HX ORTHOPAEDIC  1994    left arm surgery.  HX ORTHOPAEDIC  2015;2017    fx right humerous; plate and 12 screws removed from humerous   [facility]       Patient Active Problem List   Diagnosis Code    Dysmenorrhea N94.6    Attention deficit R41.840    Severe episode of recurrent major depressive disorder ([facility] [address] F33.2    JOHAN (generalized anxiety disorder) F41.1    Severe obesity (BMI 35.0-39. 9) E66.01           Review of Systems   Constitutional: Positive for malaise/fatigue. Respiratory: Negative for cough, hemoptysis, sputum production and shortness of breath. Cardiovascular: Negative for chest pain and palpitations. Gastrointestinal: Negative for nausea and vomiting. Musculoskeletal: Positive for myalgias. Negative for back pain. Skin: Negative for rash.          Objective: Visit Vitals  /83   Pulse 88   Temp 98 °F (36.7 °C)   Ht 5' 2\" (1.575 m)   Wt 228 lb (103.4 kg)   SpO2 97%   BMI 41.70 kg/m²        Physical Exam   Constitutional: She is oriented to person, place, and time. She appears well-developed and well-nourished. Eyes: Pupils are equal, round, and reactive to light. Conjunctivae are normal.   Neck: Normal range of motion. Neck supple. Cardiovascular: Normal rate, regular rhythm and normal heart sounds. Pulmonary/Chest: Effort normal and breath sounds normal. No respiratory distress. She has no wheezes. She has no rales. She exhibits no tenderness. Abdominal: Soft. Bowel sounds are normal. She exhibits no distension. There is no tenderness. Musculoskeletal: Normal range of motion. She exhibits no edema, tenderness or deformity. Neurological: She is alert and oriented to person, place, and time. Skin: Skin is warm and dry. Psychiatric: She has a normal mood and affect. Her behavior is normal.       Pertinent Labs/Studies:      Assessment and orders:     Diagnoses and all orders for this visit:    1) Community acquired pneumonia of left lung, unspecified part of lung        -     Discontinue Azithromycin        -     Complete course of Amoxicillin and Initiate Doxy for 4 days        -     ER precautions given for worsening symptoms.        -     RTO in 1 week for f/u  -     doxycycline (ADOXA) 100 mg tablet; Take 1 Tab by mouth two (2) times a day for 4 days. , Normal, Disp-8 Tab, R-0    2) Severe episode of recurrent major depressive disorder, without psychotic features ([facility] [address]  3) JOHAN (generalized anxiety disorder)  -     Refill Celexa and Trazodone. Urged to make f/u appointment for f/u.  -     citalopram (CELEXA) 20 mg tablet; Take 1 Tab by mouth daily. , Normal, Disp-90 Tab, R-1  -     traZODone (DESYREL) 150 mg tablet; Take 1 Tab by mouth nightly., Normal, Disp-90 Tab, R-1        I have reviewed patient medical and social history and medications.   I have reviewed pertinent labs results and other data. I have discussed the diagnosis with the patient and the intended plan as seen in the above orders. The patient has received an after-visit summary and questions were answered concerning future plans. I have discussed medication side effects and warnings with the patient as well.     Sara Henderson MD  Resident [facility]  10/30/19    Patient discussed with Dr. Juliet Lowery, Attending Physician

## 2019-10-30 NOTE — TELEPHONE ENCOUNTER
Patient called stating she was seen yesterday and she now has a rash breaking out all over her legs. She was given a zpack yesterday but this is the only change.

## 2019-10-30 NOTE — PROGRESS NOTES
36 yo female here for rash which has resolved by time of presentation    Diagnosed with pneumonia and admitted to [facility] on Friday (5 days ago) -- pneumonia reportedly seen on CT scan at [facility]    Today rash has resolved    Initially was treated with amoxicillin, zithromax was added at the office yesterday    Pt to complete amoxicillin course and 4 day course of doxycycline    I reviewed with the resident the medical history and the resident's findings on the physical examination. I discussed with the resident the patient's diagnosis and concur with the plan.

## 2019-10-31 NOTE — PROGRESS NOTES
[address] Medicine Residency Attending Addendum:  I saw and evaluated the patient on the day of the encounter with Tomer Ortiz MD  , performing the key elements of the service. I discussed the findings, assessment and plan with the resident and agree with the resident's findings and plan as documented in the resident's note.       Queta Curry MD, CAQSM, RMSK

## 2019-11-01 ENCOUNTER — OFFICE VISIT (OUTPATIENT)
Dept: FAMILY MEDICINE CLINIC | Age: 34
End: 2019-11-01

## 2019-11-01 VITALS
RESPIRATION RATE: 22 BRPM | HEART RATE: 88 BPM | BODY MASS INDEX: 42.51 KG/M2 | DIASTOLIC BLOOD PRESSURE: 83 MMHG | SYSTOLIC BLOOD PRESSURE: 125 MMHG | WEIGHT: 231 LBS | OXYGEN SATURATION: 94 % | HEIGHT: 62 IN | TEMPERATURE: 98.1 F

## 2019-11-01 DIAGNOSIS — R05.3 COUGH, PERSISTENT: Primary | ICD-10-CM

## 2019-11-01 NOTE — PROGRESS NOTES
Chief Complaint   Patient presents with    Cough     Patient went to [facility] for breathing problems, they gave her 6 breathing treatments,      1. Have you been to the ER, urgent care clinic since your last visit? Hospitalized since your last visit? Yes When: Patient went to [facility] for breathing problems, they gave her 6 breathing treatments,     2. Have you seen or consulted any other health care providers outside of the [address] [place] [address] since your last visit? Include any pap smears or colon screening. No     Reviewed record in preparation for visit and have obtained necessary documentation.

## 2019-11-01 NOTE — PATIENT INSTRUCTIONS
Start taking Tessalon and prednisone. You may also take the albuterol inhaler every 4hrs as needed for shortness of breath.

## 2019-11-01 NOTE — PROGRESS NOTES
CC: Persistent cough    HPI:    Patient is a 44yo F smoker who presents to clinic for persistent cough. She was recently diagnosed with CAP at [address] [place] [address] on 10/25 and was initially treated with amoxicillin. She followed up here at [facility] on 10/30, where she was switched to azithromyin tx. She unfortunately had a rash after the first dose of azithromycin so it was switched to doxycyline She reports that she is still coughing, with post tussive emesis, and some abdominal discomfort from coughing. She has been taking tylenol and ibuprofen with minimal relief. Last night (10/31) she went back to ED at [address] [place] [address] for her symptoms where she reports being given 6 breathing treatments with O2 sats in 90s. She was prescribed a prednisone burst, tesslon, and albuterol. She has yet to fill these prescriptions. She desires a refill of codeine/guaifenesin which she was given at her initial visit to the ED on 10/25 because she feels it has been the most helpful for her symptoms. She does have a smoking hx, smoking for about 20yrs. Review of Systems: (positive items in bold)    Constitutional: Fever,chills, night sweats, weight change  CV:  CP, palpitations, dizziness, syncope   Resp:  SOB, Cough, Wheezing  GI:  abdominal pain, n/v/d/c       Current Outpatient Medications:     doxycycline (ADOXA) 100 mg tablet, Take 1 Tab by mouth two (2) times a day for 4 days. , Disp: 8 Tab, Rfl: 0    citalopram (CELEXA) 20 mg tablet, Take 1 Tab by mouth daily. , Disp: 90 Tab, Rfl: 1    traZODone (DESYREL) 150 mg tablet, Take 1 Tab by mouth nightly., Disp: 90 Tab, Rfl: 1    azithromycin (ZITHROMAX) 250 mg tablet, 2 tablet the first day then 1 tablet daily for 4 days, Disp: 6 Tab, Rfl: 0    guaiFENesin ER (MUCINEX) 600 mg ER tablet, Take 1 Tab by mouth two (2) times a day., Disp: 30 Tab, Rfl: 0    naproxen (NAPROSYN) 500 mg tablet, Take 1 Tab by mouth every twelve (12) hours as needed for Pain., Disp: 20 Tab, Rfl: 0    lidocaine (LIDOCAINE PAIN RELIEF) 4 % patch, Apply BID PRN pain, Disp: 8 Patch, Rfl: 0    clonazePAM (KLONOPIN) 0.5 mg tablet, Take 0.5 Tabs by mouth nightly as needed. Max Daily Amount: 0.25 mg., Disp: 7 Tab, Rfl: 0    doxylamine succinate (UNISOM) 25 mg tablet, Take 25 mg by mouth nightly as needed for Sleep., Disp: , Rfl:     diclofenac potassium (CATAFLAM) 50 mg tablet, Take 50 mg by mouth three (3) times daily. , Disp: , Rfl:     levonorgestrel (MIRENA) 20 mcg/24 hr (5 years) IUD, 1 each by IntraUTERine route once., Disp: , Rfl:     ALLERGIES- REVIEWED  No Known Allergies    PAST MEDICAL HISTORY - REVIEWED  Past Medical History:   Diagnosis Date    Gall stones 2014    Heel spur, right 2017        SOCIAL HISTORY - REVIEWED   Social History     Socioeconomic History    Marital status: SINGLE     Spouse name: Not on file    Number of children: Not on file    Years of education: Not on file    Highest education level: Not on file   Occupational History    Not on file   Social Needs    Financial resource strain: Not on file    Food insecurity:     Worry: Not on file     Inability: Not on file    Transportation needs:     Medical: Not on file     Non-medical: Not on file   Tobacco Use    Smoking status: Current Some Day Smoker    Smokeless tobacco: Never Used    Tobacco comment: vapes   Substance and Sexual Activity    Alcohol use:  Yes     Alcohol/week: 0.0 standard drinks     Comment: occasionally    Drug use: No    Sexual activity: Yes     Partners: Male     Birth control/protection: Condom   Lifestyle    Physical activity:     Days per week: Not on file     Minutes per session: Not on file    Stress: Not on file   Relationships    Social connections:     Talks on phone: Not on file     Gets together: Not on file     Attends Nondenominational service: Not on file     Active member of club or organization: Not on file     Attends meetings of clubs or organizations: Not on file     Relationship status: Not on file    Intimate partner violence:     Fear of current or ex partner: Not on file     Emotionally abused: Not on file     Physically abused: Not on file     Forced sexual activity: Not on file   Other Topics Concern    Not on file   Social History Narrative    Not on file        FAMILY MEDICAL HISTORY - REVIEWED  Family History   Problem Relation Age of Onset    No Known Problems Mother     No Known Problems Father          Physical Exam:     Visit Vitals  /83 (BP 1 Location: Right arm, BP Patient Position: Sitting)   Pulse 88   Temp 98.1 °F (36.7 °C) (Oral)   Resp 22   Ht 5' 2\" (1.575 m)   Wt 231 lb (104.8 kg)   SpO2 94%   BMI 42.25 kg/m²         General appearance: alert, oriented, NAD   HEENT: PERRLA, moist mucus membranes, no LAD, boggy nasal turbinates   CV: RRR, S1/S2 heard, no m/r/g  Resp: CTAB, no wheezing, rales, or rhonchi   Abdominal: soft, mild TTP along lateral mid abdomen BL, +BS  Extremities: no swelling or edema  Skin: no visible rashes      Assessment/Plan:     1. Cough, persistent: Patient presents to clinic with persistent cough 2/2 recently diagnosed CAP from an outside hospital ED. Patient recently prescribed steroid burst, tessalon, and albuterol for symptoms but has yet to  prescription. Walking oxymetry performed today showed O2 sats >90 and patient has no wheezing or crackles on exam. Patient advised to fill prescription for prednisone, tessalon, and albuterol for her symptoms. Will hold off on prescribing codeine-quaifenesin due to risk of respiratory suppression with opioids. Given patient's recent multiple ED and clinic visits, patient advised to return to clinic within 3 days for follow up. I have discussed the diagnosis with the patient and the intended plan as seen in the above orders. The patient has received an after-visit summary and questions were answered concerning future plans. I have discussed medication side effects and warnings with the patient as well.       Patient discussed with Dr. Dalton Torres MD  Family Medicine Resident   PGY - 2

## 2019-12-14 ENCOUNTER — OFFICE VISIT (OUTPATIENT)
Dept: FAMILY MEDICINE CLINIC | Age: 34
End: 2019-12-14

## 2019-12-14 VITALS
TEMPERATURE: 98.8 F | BODY MASS INDEX: 42.58 KG/M2 | SYSTOLIC BLOOD PRESSURE: 124 MMHG | OXYGEN SATURATION: 96 % | DIASTOLIC BLOOD PRESSURE: 85 MMHG | HEART RATE: 109 BPM | WEIGHT: 231.4 LBS | HEIGHT: 62 IN | RESPIRATION RATE: 16 BRPM

## 2019-12-14 DIAGNOSIS — M25.562 PAIN IN BOTH KNEES, UNSPECIFIED CHRONICITY: Primary | ICD-10-CM

## 2019-12-14 DIAGNOSIS — M25.561 PAIN IN BOTH KNEES, UNSPECIFIED CHRONICITY: Primary | ICD-10-CM

## 2019-12-14 NOTE — PROGRESS NOTES
Chief Complaint   Patient presents with    Knee Pain     5am this morning bilateral knee pain (non-trauma). Pt woke and knees feel like they gave out. 1. Have you been to the ER, urgent care clinic since your last visit? Hospitalized since your last visit? No    2. Have you seen or consulted any other health care providers outside of the [address] [place] [address] since your last visit? Include any pap smears or colon screening.   No

## 2019-12-14 NOTE — PROGRESS NOTES
HISTORY OF PRESENT ILLNESS  Angeli Ornelas is a 37 y.o. female. HPI   This 37year old comes in c/o she has been having \"sudden\" bilateral knee pian in the middle of the night. States she has had several of these episodes, that last for a few hours and then resolve spontaneously. She denies trauma      Review of Systems   Constitutional: Negative for chills and fever. Musculoskeletal: Positive for joint pain (knee pain but not currently). Physical Exam  Constitutional:       Appearance: She is obese. Musculoskeletal: Normal range of motion. General: No swelling, tenderness, deformity or signs of injury. Right lower leg: No edema. Left lower leg: No edema. Neurological:      Mental Status: She is alert. ASSESSMENT and PLAN    ICD-10-CM ICD-9-CM    1.  Pain in both knees, unspecified chronicity M25.561 719.46     M25.562     will follow up with one of our [facility] folks  Precautions given

## 2020-08-06 ENCOUNTER — HOSPITAL ENCOUNTER (OUTPATIENT)
Dept: MRI IMAGING | Age: 35
Discharge: HOME | End: 2020-08-06
Attending: ORTHOPAEDIC SURGERY
Payer: COMMERCIAL

## 2020-08-06 VITALS
DIASTOLIC BLOOD PRESSURE: 67 MMHG | WEIGHT: 230 LBS | HEIGHT: 62 IN | RESPIRATION RATE: 12 BRPM | SYSTOLIC BLOOD PRESSURE: 109 MMHG | HEART RATE: 84 BPM | BODY MASS INDEX: 42.33 KG/M2 | OXYGEN SATURATION: 95 %

## 2020-08-06 DIAGNOSIS — M54.16 LUMBAR RADICULITIS: ICD-10-CM

## 2020-08-06 PROCEDURE — 99152 MOD SED SAME PHYS/QHP 5/>YRS: CPT

## 2020-08-06 PROCEDURE — 99153 MOD SED SAME PHYS/QHP EA: CPT

## 2020-08-06 PROCEDURE — 74011250636 HC RX REV CODE- 250/636: Performed by: RADIOLOGY

## 2020-08-06 PROCEDURE — 72148 MRI LUMBAR SPINE W/O DYE: CPT

## 2020-08-06 RX ORDER — MIDAZOLAM HYDROCHLORIDE 1 MG/ML
5 INJECTION, SOLUTION INTRAMUSCULAR; INTRAVENOUS
Status: DISCONTINUED | OUTPATIENT
Start: 2020-08-06 | End: 2020-08-06

## 2020-08-06 RX ORDER — FENTANYL CITRATE 50 UG/ML
100 INJECTION, SOLUTION INTRAMUSCULAR; INTRAVENOUS
Status: DISCONTINUED | OUTPATIENT
Start: 2020-08-06 | End: 2020-08-06

## 2020-08-06 RX ORDER — HYDROXYZINE PAMOATE 25 MG/1
25 CAPSULE ORAL 2 TIMES DAILY
COMMUNITY
End: 2022-05-23

## 2020-08-06 RX ORDER — GABAPENTIN 400 MG/1
400 CAPSULE ORAL 3 TIMES DAILY
COMMUNITY

## 2020-08-06 RX ADMIN — FENTANYL CITRATE 25 MCG: 50 INJECTION, SOLUTION INTRAMUSCULAR; INTRAVENOUS at 08:12

## 2020-08-06 RX ADMIN — FENTANYL CITRATE 50 MCG: 50 INJECTION, SOLUTION INTRAMUSCULAR; INTRAVENOUS at 08:09

## 2020-08-06 RX ADMIN — MIDAZOLAM HYDROCHLORIDE 2 MG: 1 INJECTION, SOLUTION INTRAMUSCULAR; INTRAVENOUS at 08:40

## 2020-08-06 RX ADMIN — MIDAZOLAM HYDROCHLORIDE 1 MG: 1 INJECTION, SOLUTION INTRAMUSCULAR; INTRAVENOUS at 08:18

## 2020-08-06 RX ADMIN — FENTANYL CITRATE 25 MCG: 50 INJECTION, SOLUTION INTRAMUSCULAR; INTRAVENOUS at 08:15

## 2020-08-06 RX ADMIN — FENTANYL CITRATE 25 MCG: 50 INJECTION, SOLUTION INTRAMUSCULAR; INTRAVENOUS at 08:18

## 2020-08-06 RX ADMIN — MIDAZOLAM HYDROCHLORIDE 2 MG: 1 INJECTION, SOLUTION INTRAMUSCULAR; INTRAVENOUS at 08:15

## 2020-08-06 RX ADMIN — MIDAZOLAM HYDROCHLORIDE 2 MG: 1 INJECTION, SOLUTION INTRAMUSCULAR; INTRAVENOUS at 08:09

## 2020-08-06 RX ADMIN — MIDAZOLAM HYDROCHLORIDE 1 MG: 1 INJECTION, SOLUTION INTRAMUSCULAR; INTRAVENOUS at 08:12

## 2020-08-06 NOTE — H&P
Interventional Radiology History and Physical (Outpatient)    8/6/2020    Patient: Dain Kanner 37 y.o. female     Referring Physician:  Fanta Lazcano MD    Chief Complaint: need mri, claustrophobic    History of Present Illness: pain    History:  Past Medical History:   Diagnosis Date    Gall stones 2014    Heel spur, right 2017     Family History   Problem Relation Age of Onset    No Known Problems Mother     No Known Problems Father      Social History     Socioeconomic History    Marital status: SINGLE     Spouse name: Not on file    Number of children: Not on file    Years of education: Not on file    Highest education level: Not on file   Occupational History    Not on file   Social Needs    Financial resource strain: Not on file    Food insecurity     Worry: Not on file     Inability: Not on file    Transportation needs     Medical: Not on file     Non-medical: Not on file   Tobacco Use    Smoking status: Current Some Day Smoker    Smokeless tobacco: Never Used    Tobacco comment: vapes   Substance and Sexual Activity    Alcohol use:  Yes     Alcohol/week: 0.0 standard drinks     Comment: occasionally    Drug use: No    Sexual activity: Yes     Partners: Male     Birth control/protection: Condom   Lifestyle    Physical activity     Days per week: Not on file     Minutes per session: Not on file    Stress: Not on file   Relationships    Social connections     Talks on phone: Not on file     Gets together: Not on file     Attends Anabaptism service: Not on file     Active member of club or organization: Not on file     Attends meetings of clubs or organizations: Not on file     Relationship status: Not on file    Intimate partner violence     Fear of current or ex partner: Not on file     Emotionally abused: Not on file     Physically abused: Not on file     Forced sexual activity: Not on file   Other Topics Concern    Not on file   Social History Narrative    Not on file Allergies: No Known Allergies    Prior to Admission Medications:  Prior to Admission medications    Medication Sig Start Date End Date Taking? Authorizing Provider   citalopram (CELEXA) 20 mg tablet Take 1 Tab by mouth daily. 10/30/19   Chalo Palomares MD   traZODone (DESYREL) 150 mg tablet Take 1 Tab by mouth nightly. 10/30/19   Chalo Palomares MD   azithromycin (ZITHROMAX) 250 mg tablet 2 tablet the first day then 1 tablet daily for 4 days 10/29/19   Tomer Ortiz MD   guaiFENesin ER (MUCINEX) 600 mg ER tablet Take 1 Tab by mouth two (2) times a day. 10/29/19   Fred Ortiz MD   naproxen (NAPROSYN) 500 mg tablet Take 1 Tab by mouth every twelve (12) hours as needed for Pain. 7/19/19   Bee العلي MD   lidocaine (LIDOCAINE PAIN RELIEF) 4 % patch Apply BID PRN pain 7/19/19   Bee العلي MD   clonazePAM (KLONOPIN) 0.5 mg tablet Take 0.5 Tabs by mouth nightly as needed. Max Daily Amount: 0.25 mg. 7/23/18   Chalo Palomares MD   doxylamine succinate (UNISOM) 25 mg tablet Take 25 mg by mouth nightly as needed for Sleep. Provider, Historical   diclofenac potassium (CATAFLAM) 50 mg tablet Take 50 mg by mouth three (3) times daily. Provider, Historical   levonorgestrel (MIRENA) 20 mcg/24 hr (5 years) IUD 1 each by IntraUTERine route once. Provider, Historical       Physical Exam:    Blood pressure 143/88, pulse 82, resp. rate 13, height 5' 2\" (1.575 m), weight 104.3 kg (230 lb), SpO2 97 %. General: alert, cooperative, no distress, appears stated age  Heart: rrr  Lungs: clear to auscultation bilaterally  Abdomen: soft  Neuro: grossly intact  Extremities: wnl  Plan of Care/Planned Procedure:  Risks, benefits, and alternatives reviewed with patient and she agrees to proceed with the procedure.      Stalin Pretty MD

## 2020-08-06 NOTE — PROGRESS NOTES
Pt received to [facility] [place]note 2] ambulatory. Changed to gown and assessed for MRI lumbar with sedation. Confirmed NPO/allergies. IV started in L forearm, vitals stable, Mallampati 2 with 3 fingers, LS clear, HS S1, S2. Dr Sagar Silverio here for consent. Pt states 3/10 pain in back baseline. 0800--To MRI, timeout at 0808, start time 0809, stop time 0850. Pt tolerated well, total of 8 mg versed and 125 mcg fentanyl given for procedure. Returned to [facility] [place][note 2] in stable condition. 0900--Tolerating PO, no pain, vitals stable. D/C directions given to patient, disc in hand. Taken out to  [facility] in w/c for d/c.

## 2022-03-18 PROBLEM — F33.2 SEVERE EPISODE OF RECURRENT MAJOR DEPRESSIVE DISORDER (HCC): Status: ACTIVE | Noted: 2018-07-02

## 2022-03-19 PROBLEM — F41.1 GAD (GENERALIZED ANXIETY DISORDER): Status: ACTIVE | Noted: 2018-07-02

## 2022-05-11 ENCOUNTER — OFFICE VISIT (OUTPATIENT)
Dept: FAMILY MEDICINE CLINIC | Age: 37
End: 2022-05-11
Payer: COMMERCIAL

## 2022-05-11 VITALS
BODY MASS INDEX: 42.8 KG/M2 | HEART RATE: 90 BPM | DIASTOLIC BLOOD PRESSURE: 95 MMHG | SYSTOLIC BLOOD PRESSURE: 145 MMHG | OXYGEN SATURATION: 96 % | WEIGHT: 234 LBS

## 2022-05-11 DIAGNOSIS — M54.2 NECK PAIN: ICD-10-CM

## 2022-05-11 DIAGNOSIS — S16.1XXA STRAIN OF CERVICAL PORTION OF BOTH TRAPEZIUS MUSCLES: ICD-10-CM

## 2022-05-11 DIAGNOSIS — M62.838 TRAPEZIUS MUSCLE SPASM: ICD-10-CM

## 2022-05-11 DIAGNOSIS — S06.0X9A CONCUSSION WITH LOSS OF CONSCIOUSNESS, INITIAL ENCOUNTER: Primary | ICD-10-CM

## 2022-05-11 PROCEDURE — 99214 OFFICE O/P EST MOD 30 MIN: CPT | Performed by: STUDENT IN AN ORGANIZED HEALTH CARE EDUCATION/TRAINING PROGRAM

## 2022-05-11 RX ORDER — TRAMADOL HYDROCHLORIDE 50 MG/1
TABLET ORAL
COMMUNITY
Start: 2022-05-01 | End: 2022-05-23

## 2022-05-11 RX ORDER — NAPROXEN 500 MG/1
500 TABLET ORAL 2 TIMES DAILY WITH MEALS
Qty: 28 TABLET | Refills: 0 | Status: SHIPPED | OUTPATIENT
Start: 2022-05-11 | End: 2022-05-25

## 2022-05-11 RX ORDER — ARIPIPRAZOLE 20 MG/1
TABLET ORAL
COMMUNITY
End: 2022-05-23 | Stop reason: SDUPTHER

## 2022-05-11 RX ORDER — CYCLOBENZAPRINE HCL 10 MG
TABLET ORAL
COMMUNITY
Start: 2022-04-29

## 2022-05-11 RX ORDER — BLOOD SUGAR DIAGNOSTIC
STRIP MISCELLANEOUS
COMMUNITY
Start: 2022-04-18 | End: 2022-05-23 | Stop reason: SDUPTHER

## 2022-05-11 RX ORDER — IBUPROFEN 600 MG/1
600 TABLET ORAL
COMMUNITY
Start: 2022-04-24 | End: 2022-05-23

## 2022-05-11 RX ORDER — METFORMIN HYDROCHLORIDE 1000 MG/1
TABLET ORAL
COMMUNITY
End: 2022-05-23 | Stop reason: SDUPTHER

## 2022-05-11 RX ORDER — OXYCODONE AND ACETAMINOPHEN 5; 325 MG/1; MG/1
TABLET ORAL
COMMUNITY
Start: 2022-04-25 | End: 2022-05-23
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
## 2022-05-11 NOTE — LETTER
NOTIFICATION RETURN TO WORK / SCHOOL    5/11/2022 2:32 PM    Ms. Cary Quigley  [address] [address]      To Whom It May Concern:    Cary Quigley is currently under the care of [facility]. She will return to work/school on: Thursday May 26th 2022    If there are questions or concerns please have the patient contact our office.         Sincerely,      Oneal Tsai MD

## 2022-05-23 ENCOUNTER — OFFICE VISIT (OUTPATIENT)
Dept: FAMILY MEDICINE CLINIC | Age: 37
End: 2022-05-23
Payer: COMMERCIAL

## 2022-05-23 VITALS
SYSTOLIC BLOOD PRESSURE: 111 MMHG | TEMPERATURE: 98 F | HEIGHT: 62 IN | HEART RATE: 96 BPM | OXYGEN SATURATION: 96 % | RESPIRATION RATE: 17 BRPM | WEIGHT: 236 LBS | DIASTOLIC BLOOD PRESSURE: 75 MMHG | BODY MASS INDEX: 43.43 KG/M2

## 2022-05-23 DIAGNOSIS — G47.00 INSOMNIA, UNSPECIFIED TYPE: ICD-10-CM

## 2022-05-23 DIAGNOSIS — F41.1 GAD (GENERALIZED ANXIETY DISORDER): ICD-10-CM

## 2022-05-23 DIAGNOSIS — F33.2 SEVERE EPISODE OF RECURRENT MAJOR DEPRESSIVE DISORDER, WITHOUT PSYCHOTIC FEATURES (HCC): ICD-10-CM

## 2022-05-23 DIAGNOSIS — E11.9 TYPE 2 DIABETES MELLITUS WITHOUT COMPLICATION, WITHOUT LONG-TERM CURRENT USE OF INSULIN (HCC): Primary | ICD-10-CM

## 2022-05-23 DIAGNOSIS — Z76.0 ENCOUNTER FOR MEDICATION REFILL: ICD-10-CM

## 2022-05-23 LAB — HBA1C MFR BLD HPLC: 5.9 %

## 2022-05-23 PROCEDURE — 83036 HEMOGLOBIN GLYCOSYLATED A1C: CPT

## 2022-05-23 PROCEDURE — 99214 OFFICE O/P EST MOD 30 MIN: CPT

## 2022-05-23 PROCEDURE — 3044F HG A1C LEVEL LT 7.0%: CPT

## 2022-05-23 RX ORDER — METFORMIN HYDROCHLORIDE 1000 MG/1
TABLET ORAL
Qty: 30 TABLET | Refills: 2 | Status: SHIPPED | OUTPATIENT
Start: 2022-05-23 | End: 2022-07-27 | Stop reason: SDUPTHER

## 2022-05-23 RX ORDER — BLOOD SUGAR DIAGNOSTIC
STRIP MISCELLANEOUS
Qty: 100 STRIP | Refills: 1 | Status: SHIPPED | OUTPATIENT
Start: 2022-05-23 | End: 2022-11-02 | Stop reason: SDUPTHER

## 2022-05-23 RX ORDER — TRAZODONE HYDROCHLORIDE 150 MG/1
150 TABLET ORAL
Qty: 90 TABLET | Refills: 1 | Status: SHIPPED | OUTPATIENT
Start: 2022-05-23 | End: 2022-07-27 | Stop reason: SDUPTHER

## 2022-05-23 RX ORDER — ARIPIPRAZOLE 20 MG/1
TABLET ORAL
Qty: 30 TABLET | Refills: 2 | Status: SHIPPED | OUTPATIENT
Start: 2022-05-23 | End: 2022-07-27 | Stop reason: SDUPTHER

## 2022-05-23 RX ORDER — LANCETS
EACH MISCELLANEOUS
Qty: 1 EACH | Refills: 11 | Status: SHIPPED | OUTPATIENT
Start: 2022-05-23
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
## 2022-05-23 NOTE — PROGRESS NOTES
POC A1c 5.9 at goal, continue current management w/ metformin. Recommended continued lifestyle and diet modifications.

## 2022-05-25 ENCOUNTER — OFFICE VISIT (OUTPATIENT)
Dept: FAMILY MEDICINE CLINIC | Age: 37
End: 2022-05-25
Payer: COMMERCIAL

## 2022-05-25 VITALS
DIASTOLIC BLOOD PRESSURE: 99 MMHG | SYSTOLIC BLOOD PRESSURE: 137 MMHG | WEIGHT: 236.38 LBS | HEART RATE: 104 BPM | BODY MASS INDEX: 43.23 KG/M2 | OXYGEN SATURATION: 98 %

## 2022-05-25 DIAGNOSIS — S06.0X9D CONCUSSION WITH LOSS OF CONSCIOUSNESS, SUBSEQUENT ENCOUNTER: Primary | ICD-10-CM

## 2022-05-25 DIAGNOSIS — M79.10 TRIGGER POINT: ICD-10-CM

## 2022-05-25 PROCEDURE — 99214 OFFICE O/P EST MOD 30 MIN: CPT | Performed by: STUDENT IN AN ORGANIZED HEALTH CARE EDUCATION/TRAINING PROGRAM

## 2022-05-25 NOTE — LETTER
NOTIFICATION RETURN TO WORK / SCHOOL    5/25/2022 2:31 PM    Ms. Bryn Stevens  [address] [address]      To Whom It May Concern:    Bryn Stevens is currently under the care of [facility]. She can return to light duty as tolerated. No heavy lifting or pulling/pushing until re-assessment in 2-3 weeks. If there are questions or concerns please have the patient contact our office.         Sincerely,      Mckenzie Amato MD
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
## 2022-06-20 ENCOUNTER — TRANSCRIBE ORDER (OUTPATIENT)
Dept: SCHEDULING | Age: 37
End: 2022-06-20

## 2022-06-20 DIAGNOSIS — R20.2 PARESTHESIA OF SKIN: Primary | ICD-10-CM

## 2022-06-20 DIAGNOSIS — M25.562 LEFT KNEE PAIN: ICD-10-CM

## 2022-06-22 ENCOUNTER — OFFICE VISIT (OUTPATIENT)
Dept: FAMILY MEDICINE CLINIC | Age: 37
End: 2022-06-22
Payer: COMMERCIAL

## 2022-06-22 VITALS
BODY MASS INDEX: 42.88 KG/M2 | HEART RATE: 81 BPM | DIASTOLIC BLOOD PRESSURE: 81 MMHG | WEIGHT: 233 LBS | OXYGEN SATURATION: 95 % | RESPIRATION RATE: 18 BRPM | HEIGHT: 62 IN | TEMPERATURE: 97 F | SYSTOLIC BLOOD PRESSURE: 125 MMHG

## 2022-06-22 DIAGNOSIS — M79.10 TRIGGER POINT: ICD-10-CM

## 2022-06-22 DIAGNOSIS — R20.0 LEFT ARM NUMBNESS: ICD-10-CM

## 2022-06-22 DIAGNOSIS — S06.0X9D CONCUSSION WITH LOSS OF CONSCIOUSNESS, SUBSEQUENT ENCOUNTER: Primary | ICD-10-CM

## 2022-06-22 DIAGNOSIS — M54.2 NECK PAIN: ICD-10-CM

## 2022-06-22 PROCEDURE — 99213 OFFICE O/P EST LOW 20 MIN: CPT | Performed by: STUDENT IN AN ORGANIZED HEALTH CARE EDUCATION/TRAINING PROGRAM

## 2022-06-22 PROCEDURE — 20553 NJX 1/MLT TRIGGER POINTS 3/>: CPT | Performed by: STUDENT IN AN ORGANIZED HEALTH CARE EDUCATION/TRAINING PROGRAM

## 2022-06-22 RX ORDER — TRAMADOL HYDROCHLORIDE 50 MG/1
TABLET ORAL
COMMUNITY
Start: 2022-05-30

## 2022-06-22 RX ORDER — LIDOCAINE HYDROCHLORIDE 10 MG/ML
5 INJECTION INFILTRATION; PERINEURAL ONCE
Status: COMPLETED | OUTPATIENT
Start: 2022-06-22 | End: 2022-06-22

## 2022-06-22 RX ADMIN — LIDOCAINE HYDROCHLORIDE 5 ML: 10 INJECTION INFILTRATION; PERINEURAL at 16:39
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
## 2022-06-22 NOTE — PROGRESS NOTES
Identified Patient with two Patient identifiers (Name and ). Two Patient Identifiers confirmed. Reviewed record in preparation for visit and have obtained necessary documentation. Chief Complaint   Patient presents with    Concussion     follow up       Visit Vitals  /81 (BP 1 Location: Right arm, BP Patient Position: Sitting, BP Cuff Size: Large adult)   Pulse 81   Temp 97 °F (36.1 °C) (Temporal)   Resp 18   Ht 5' 2\" (1.575 m)   Wt 233 lb (105.7 kg)   SpO2 95%   BMI 42.62 kg/m²       1. Have you been to the ER, urgent care clinic since your last visit? Hospitalized since your last visit? No    2. Have you seen or consulted any other health care providers outside of the [address] [place] [address] since your last visit? Include any pap smears or colon screening.  No

## 2022-07-05 ENCOUNTER — VIRTUAL VISIT (OUTPATIENT)
Dept: FAMILY MEDICINE CLINIC | Age: 37
End: 2022-07-05
Payer: COMMERCIAL

## 2022-07-05 DIAGNOSIS — L81.9 SKIN HYPOPIGMENTATION: Primary | ICD-10-CM

## 2022-07-05 PROCEDURE — 99212 OFFICE O/P EST SF 10 MIN: CPT

## 2022-07-05 NOTE — PROGRESS NOTES
Carri Waldron  43 y.o. female  1985  [address] [facility] [place] [id number]  [id number]   [address]:    Telemedicine Progress Note  David Lucio DO       Encounter Date and Time: July 5, 2022 at 1:30 PM    Consent:  She and/or the health care decision maker is aware that that she may receive a bill for this telephone service, depending on her insurance coverage, and has provided verbal consent to proceed: Yes    No chief complaint on file. History of Present Illness   Carri Waldron is a 43 y.o. female was evaluated by synchronous (real-time) audio-video technology from home, through the [employer] portal.    Carri Waldron is an 43 y.o. female who presents for skin problem. Has noticed hypopigmentation on L thumb and wrist. Also on L foot. Not painful or itchy. Not raised. Spots are round, although it was difficult to determine all shapes and distribution through video. First noticed 1 week ago while in the shower. Has not noticed significant change in size. Has not used any different hand creams. No steroid cream or injections. No change in soaps/detergent/lotion  No allergies. No medication changes. No recent chemical exposure. No known autoimmune disease. No recent illness. COVID Questions:   Experiencing any of the following symptoms: fever, chills, cough, SOB, diarrhea, URI symptoms. No  Any Sick contacts with fever, cough, diarrhea, SOB, URI symptoms. No  Traveled out of state or out of country. No  Has been staying at home.  Yes    Review of Systems   General/Constitutional:  No headache, fever, fatigue, weight loss or weight gain     Neck:  No swelling, masses, stiffness, pain, or limited movemen    Cardiac:   No chest pain    Respiratory:  No cough or shortness of breath    GI:  No nausea/vomiting, diarrhea, abdominal pain, bloody or dark stools     :  No dysuria or  hematuria   Neurological:  No loss of consciousness, dizziness, seizures, dysarthria, cognitive changes, memory changes,  problems with balance, or unilateral weakness    Skin: +areas of skin hypopigmentation     Vitals/Objective:     General: alert, cooperative, no distress   Mental  status: mental status: alert, oriented to person, place, and time, normal mood, behavior, speech, dress, motor activity, and thought processes   Resp: resp: normal effort and no respiratory distress   Neuro: neuro: no gross deficits   Skin: skin: areas of hypopigmentation, only 1 spot visualized through video technology, appeared round 5mm, flat, non-erythematous    Due to this being a TeleHealth evaluation, many elements of the physical examination are unable to be assessed. Assessment and Plan:   Time-based coding, delete if not needed: I spent at least 15 minutes with this established patient, and >50% of the time was spent counseling and/or coordinating care regarding skin hypopigmentation    1. Skin hypopigmentation    Assessment/Plan:  Patient appears to have multiple hypopigmented areas on her extremities. Only 1 was visualized during this VV. Extent, size and distribution is unclear. Patient is otherwise asymptomatic and hasn't had any identifiable triggers/changes to cause this. May be possible fungal infection vs vitiligo. Less likely reactive/post-inflammatory, however will have to see patient in-person  - Schedule patient for in-person skin evaluation for 6/8   - Consider dermatology referral if suspicious for vitiligo    Time spent in direct conversation with the patient to include medical condition(s) discussed, assessment and treatment plan:       We discussed the expected course, resolution and complications of the diagnosis(es) in detail. Medication risks, benefits, costs, interactions, and alternatives were discussed as indicated. I advised her to contact the office if her condition worsens, changes or fails to improve as anticipated.  She expressed understanding with the diagnosis(es) and plan. Patient understands that this encounter was a temporary measure, and the importance of further follow up and examination was emphasized. Patient verbalized understanding. Patient informed to follow up: 6/8. Electronically Signed: Lisbeth Hargrove DO    Providers location when delivering service (clinic, hospital, home): home      Pursuant to the emergency declaration under the [facility] [place] [address] waiver authority and the Coronavirus Preparedness and Response Supplemental Appropriations Act, this Virtual  Visit was conducted, with patient's consent, to reduce the patient's risk of exposure to COVID-19 and provide continuity of care for an established patient. Services were provided through a video synchronous discussion virtually to substitute for in-person clinic visit. History   Patients past medical, surgical and family histories were reviewed and updated. Past Medical History:   Diagnosis Date    Gall stones 2014    Heel spur, right 2017    Spondylosis with myelopathy      Past Surgical History:   Procedure Laterality Date    HX CHOLECYSTECTOMY      HX GI  9/16/14    gallbladder    HX HEENT      T&A    HX ORTHOPAEDIC  1994    left arm surgery.  HX ORTHOPAEDIC  2015;2017    fx right humerous; plate and 12 screws removed from humerous    HX TONSILLECTOMY  1996     Family History   Problem Relation Age of Onset    No Known Problems Mother     No Known Problems Father      Social History     Socioeconomic History    Marital status: SINGLE     Spouse name: Not on file    Number of children: Not on file    Years of education: Not on file    Highest education level: Not on file   Occupational History    Not on file   Tobacco Use    Smoking status: Current Some Day Smoker    Smokeless tobacco: Never Used    Tobacco comment: vapes   Substance and Sexual Activity    Alcohol use:  Yes     Alcohol/week: 0.0 standard drinks Comment: occasionally    Drug use: No    Sexual activity: Yes     Partners: Male     Birth control/protection: Condom   Other Topics Concern    Not on file   Social History Narrative    Not on file     Social Determinants of Health     Financial Resource Strain:     Difficulty of Paying Living Expenses: Not on file   Food Insecurity:     Worried About Running Out of Food in the Last Year: Not on file    Ed of Food in the Last Year: Not on file   Transportation Needs:     Lack of Transportation (Medical): Not on file    Lack of Transportation (Non-Medical): Not on file   Physical Activity:     Days of Exercise per Week: Not on file    Minutes of Exercise per Session: Not on file   Stress:     Feeling of Stress : Not on file   Social Connections:     Frequency of Communication with Friends and Family: Not on file    Frequency of Social Gatherings with Friends and Family: Not on file    Attends [employer] Services: Not on file    Active Member of [address] [place] [address] or Organizations: Not on file    Attends Club or Organization Meetings: Not on file    Marital Status: Not on file   Intimate Partner Violence:     Fear of Current or Ex-Partner: Not on file    Emotionally Abused: Not on file    Physically Abused: Not on file    Sexually Abused: Not on file   Housing Stability:     Unable to Pay for Housing in the Last Year: Not on file    Number of [place] in the Last Year: Not on file    Unstable Housing in the Last Year: Not on file     Patient Active Problem List   Diagnosis Code    Dysmenorrhea N94.6    Attention deficit R41.840    Severe episode of recurrent major depressive disorder ([facility] [address] F33.2    JOHAN (generalized anxiety disorder) F41.1    Severe obesity (BMI 35.0-39. 9) E66.01    Type 2 diabetes mellitus without complication, without long-term current use of insulin (HCC) E11.9          Current Medications/Allergies   Medications and Allergies reviewed:    Current Outpatient Medications Medication Sig Dispense Refill    traMADoL (ULTRAM) 50 mg tablet TAKE 1 TABLET BY MOUTH TWICE A DAY AS NEEDED      ARIPiprazole (Abilify) 20 mg tablet Abilify 20 mg tablet  Take 1 tablet every day by oral route. 30 Tablet 2    metFORMIN (GLUCOPHAGE) 1,000 mg tablet Take 1 tablet twice a day 30 Tablet 2    OneTouch Ultra Test strip USE TO TEST AS DIRECTED DAILY DX: E11.9 IN VITRO 30 DAY(S) 100 Strip 1    traZODone (DESYREL) 150 mg tablet Take 1 Tablet by mouth nightly. 90 Tablet 1    lancets misc Use daily as instructed 1 Each 11    cyclobenzaprine (FLEXERIL) 10 mg tablet TAKE 1 TABLET BY MOUTH THREE TIMES A DAY AS NEEDED FOR MUSCLE SPASMS (Patient not taking: Reported on 6/22/2022)      gabapentin (NEURONTIN) 400 mg capsule Take 400 mg by mouth three (3) times daily.  levonorgestrel (MIRENA) 20 mcg/24 hr (5 years) IUD 1 each by IntraUTERine route once.        No Known Allergies

## 2022-07-15 ENCOUNTER — OFFICE VISIT (OUTPATIENT)
Dept: FAMILY MEDICINE CLINIC | Age: 37
End: 2022-07-15
Payer: COMMERCIAL

## 2022-07-15 VITALS
DIASTOLIC BLOOD PRESSURE: 83 MMHG | SYSTOLIC BLOOD PRESSURE: 135 MMHG | RESPIRATION RATE: 16 BRPM | TEMPERATURE: 98 F | HEART RATE: 120 BPM | OXYGEN SATURATION: 95 % | HEIGHT: 62 IN | BODY MASS INDEX: 43.24 KG/M2 | WEIGHT: 235 LBS

## 2022-07-15 DIAGNOSIS — L81.9 HYPOPIGMENTED SKIN LESION: Primary | ICD-10-CM
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
## 2022-07-15 PROCEDURE — 99214 OFFICE O/P EST MOD 30 MIN: CPT

## 2022-07-15 RX ORDER — KETOCONAZOLE 20 MG/G
CREAM TOPICAL DAILY
Qty: 45 G | Refills: 4 | Status: SHIPPED | OUTPATIENT
Start: 2022-07-15 | End: 2022-07-29

## 2022-07-15 NOTE — PROGRESS NOTES
Kevin Narayanan is a 43 y.o. female    Chief Complaint   Patient presents with    Skin Problem     Patient is coming in for white skin patches that she noticed a few weeks ago. It mostly on her hands and the top of her feet. Patient started with runny nose and cough since 5 days ago. No other concerns. 1. Have you been to the ER, urgent care clinic since your last visit? Hospitalized since your last visit? No  2. Have you seen or consulted any other health care providers outside of the [address] [place] [address] since your last visit? Include any pap smears or colon screening. No      Visit Vitals  /83 (BP 1 Location: Right upper arm, BP Patient Position: Sitting)   Pulse (!) 120   Temp 98 °F (36.7 °C) (Oral)   Resp 16   Ht 5' 2\" (1.575 m)   Wt 235 lb (106.6 kg)   SpO2 95%   BMI 42.98 kg/m²           Health Maintenance Due   Topic Date Due    Hepatitis C Screening  Never done    COVID-19 Vaccine (1) Never done    Pneumococcal 0-64 years (1 - PCV) Never done    Foot Exam Q1  Never done    MICROALBUMIN Q1  Never done    Eye Exam Retinal or Dilated  Never done    Lipid Screen  Never done    Cervical cancer screen  09/26/2019         Medication Reconciliation completed, changes noted.   Please  Update medication list.
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
## 2022-07-19 ENCOUNTER — TRANSCRIBE ORDER (OUTPATIENT)
Dept: SCHEDULING | Age: 37
End: 2022-07-19

## 2022-07-19 DIAGNOSIS — M25.511 PAIN IN RIGHT SHOULDER: Primary | ICD-10-CM

## 2022-07-27 ENCOUNTER — OFFICE VISIT (OUTPATIENT)
Dept: FAMILY MEDICINE CLINIC | Age: 37
End: 2022-07-27
Payer: COMMERCIAL

## 2022-07-27 VITALS
WEIGHT: 236.13 LBS | DIASTOLIC BLOOD PRESSURE: 80 MMHG | SYSTOLIC BLOOD PRESSURE: 114 MMHG | TEMPERATURE: 97.5 F | HEART RATE: 89 BPM | BODY MASS INDEX: 43.19 KG/M2 | OXYGEN SATURATION: 96 %

## 2022-07-27 DIAGNOSIS — Z76.0 ENCOUNTER FOR MEDICATION REFILL: ICD-10-CM

## 2022-07-27 DIAGNOSIS — R05.3 PERSISTENT COUGH FOR 3 WEEKS OR LONGER: ICD-10-CM

## 2022-07-27 DIAGNOSIS — E11.9 TYPE 2 DIABETES MELLITUS WITHOUT COMPLICATION, WITHOUT LONG-TERM CURRENT USE OF INSULIN (HCC): ICD-10-CM

## 2022-07-27 DIAGNOSIS — F33.2 SEVERE EPISODE OF RECURRENT MAJOR DEPRESSIVE DISORDER, WITHOUT PSYCHOTIC FEATURES (HCC): ICD-10-CM

## 2022-07-27 DIAGNOSIS — L81.9 HYPOPIGMENTED SKIN LESION: Primary | ICD-10-CM

## 2022-07-27 DIAGNOSIS — G47.00 INSOMNIA, UNSPECIFIED TYPE: ICD-10-CM

## 2022-07-27 DIAGNOSIS — Z72.0 TOBACCO USE: ICD-10-CM

## 2022-07-27 DIAGNOSIS — F41.1 GAD (GENERALIZED ANXIETY DISORDER): ICD-10-CM

## 2022-07-27 PROCEDURE — 99214 OFFICE O/P EST MOD 30 MIN: CPT

## 2022-07-27 PROCEDURE — 3044F HG A1C LEVEL LT 7.0%: CPT

## 2022-07-27 RX ORDER — TRAZODONE HYDROCHLORIDE 150 MG/1
150 TABLET ORAL
Qty: 90 TABLET | Refills: 1 | Status: SHIPPED | OUTPATIENT
Start: 2022-07-27

## 2022-07-27 RX ORDER — ARIPIPRAZOLE 20 MG/1
TABLET ORAL
Qty: 30 TABLET | Refills: 2 | Status: SHIPPED | OUTPATIENT
Start: 2022-07-27

## 2022-07-27 RX ORDER — ALBUTEROL SULFATE 90 UG/1
1 AEROSOL, METERED RESPIRATORY (INHALATION)
Qty: 18 G | Refills: 1 | Status: SHIPPED | OUTPATIENT
Start: 2022-07-27 | End: 2022-11-01

## 2022-07-27 RX ORDER — METFORMIN HYDROCHLORIDE 1000 MG/1
TABLET ORAL
Qty: 30 TABLET | Refills: 2 | Status: SHIPPED | OUTPATIENT
Start: 2022-07-27 | End: 2022-08-25 | Stop reason: SDUPTHER

## 2022-07-27 NOTE — PROGRESS NOTES
Chief Complaint   Patient presents with    Follow-up     Discoloration on hand an feet     Visit Vitals  /80 (BP 1 Location: Right upper arm, BP Patient Position: Sitting, BP Cuff Size: Adult long)   Pulse 89   Temp 97.5 °F (36.4 °C)   Wt 236 lb 2 oz (107.1 kg)   SpO2 96%   BMI 43.19 kg/m²
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
## 2022-08-10 ENCOUNTER — TELEPHONE (OUTPATIENT)
Dept: FAMILY MEDICINE CLINIC | Age: 37
End: 2022-08-10

## 2022-08-10 NOTE — TELEPHONE ENCOUNTER
----- Message from Berl Cranker sent at 8/10/2022  1:36 PM EDT -----  Subject: Message to Provider    QUESTIONS  Information for Provider? PT. sees Trinity Martínez. Wants the referral to a   dermatologist faxed over to [place] [address] because they accept her insurance   Medicaid and the fax number is [phone]. The other dermatologist   didn't accept her insurance. ---------------------------------------------------------------------------  --------------  Kathryn Dewey FLYPELON  [phone]; OK to leave message on voicemail  ---------------------------------------------------------------------------  --------------  SCRIPT ANSWERS  Relationship to Patient?  Self

## 2022-08-11 ENCOUNTER — OFFICE VISIT (OUTPATIENT)
Dept: NEUROLOGY | Age: 37
End: 2022-08-11
Payer: COMMERCIAL

## 2022-08-11 DIAGNOSIS — R20.2 PARESTHESIA: Primary | ICD-10-CM

## 2022-08-11 PROCEDURE — 95886 MUSC TEST DONE W/N TEST COMP: CPT | Performed by: PSYCHIATRY & NEUROLOGY

## 2022-08-11 PROCEDURE — 95910 NRV CNDJ TEST 7-8 STUDIES: CPT | Performed by: PSYCHIATRY & NEUROLOGY
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
## 2022-08-12 RX ORDER — MOMETASONE FUROATE 1 MG/G
CREAM TOPICAL DAILY
Qty: 50 G | Refills: 0 | Status: SHIPPED | OUTPATIENT
Start: 2022-08-12 | End: 2022-08-26

## 2022-08-25 ENCOUNTER — OFFICE VISIT (OUTPATIENT)
Dept: FAMILY MEDICINE CLINIC | Age: 37
End: 2022-08-25
Payer: COMMERCIAL

## 2022-08-25 VITALS
BODY MASS INDEX: 43.43 KG/M2 | SYSTOLIC BLOOD PRESSURE: 128 MMHG | TEMPERATURE: 98.2 F | DIASTOLIC BLOOD PRESSURE: 85 MMHG | OXYGEN SATURATION: 97 % | HEIGHT: 62 IN | WEIGHT: 236 LBS | RESPIRATION RATE: 18 BRPM | HEART RATE: 91 BPM

## 2022-08-25 DIAGNOSIS — E11.9 TYPE 2 DIABETES MELLITUS WITHOUT COMPLICATION, WITHOUT LONG-TERM CURRENT USE OF INSULIN (HCC): ICD-10-CM

## 2022-08-25 DIAGNOSIS — Z76.0 ENCOUNTER FOR MEDICATION REFILL: ICD-10-CM

## 2022-08-25 DIAGNOSIS — L81.9 HYPOPIGMENTED SKIN LESION: Primary | ICD-10-CM

## 2022-08-25 PROCEDURE — 99214 OFFICE O/P EST MOD 30 MIN: CPT

## 2022-08-25 PROCEDURE — 3044F HG A1C LEVEL LT 7.0%: CPT

## 2022-08-25 RX ORDER — METFORMIN HYDROCHLORIDE 1000 MG/1
TABLET ORAL
Qty: 30 TABLET | Refills: 2 | Status: SHIPPED | OUTPATIENT
Start: 2022-08-25 | End: 2022-11-01

## 2022-08-25 NOTE — PROGRESS NOTES
Identified Patient with two Patient identifiers (Name and ). Two Patient Identifiers confirmed. Reviewed record in preparation for visit and have obtained necessary documentation. Chief Complaint   Patient presents with    Skin Problem     Patient with hypopigmentation on bilateral hands and feet x2 months. Visit Vitals  /85 (BP 1 Location: Right arm, BP Patient Position: Sitting, BP Cuff Size: Adult)   Pulse 91   Temp 98.2 °F (36.8 °C) (Oral)   Resp 18   Ht 5' 2\" (1.575 m)   Wt 236 lb (107 kg)   SpO2 97%   BMI 43.16 kg/m²       1. Have you been to the ER, urgent care clinic since your last visit? Hospitalized since your last visit? No    2. Have you seen or consulted any other health care providers outside of the [address] [place] [address] since your last visit? Include any pap smears or colon screening.  No
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
## 2022-10-07 ENCOUNTER — HOSPITAL ENCOUNTER (OUTPATIENT)
Dept: MRI IMAGING | Age: 37
Discharge: HOME | End: 2022-10-07
Payer: COMMERCIAL

## 2022-10-07 DIAGNOSIS — M25.511 PAIN IN RIGHT SHOULDER: ICD-10-CM

## 2022-10-07 PROCEDURE — 73221 MRI JOINT UPR EXTREM W/O DYE: CPT

## 2022-10-31 ENCOUNTER — TELEPHONE (OUTPATIENT)
Dept: FAMILY MEDICINE CLINIC | Age: 37
End: 2022-10-31

## 2022-10-31 DIAGNOSIS — E11.9 TYPE 2 DIABETES MELLITUS WITHOUT COMPLICATION, WITHOUT LONG-TERM CURRENT USE OF INSULIN (HCC): ICD-10-CM

## 2022-10-31 DIAGNOSIS — Z76.0 ENCOUNTER FOR MEDICATION REFILL: ICD-10-CM

## 2022-11-02 RX ORDER — BLOOD SUGAR DIAGNOSTIC
STRIP MISCELLANEOUS
Qty: 100 STRIP | Refills: 1 | Status: SHIPPED | OUTPATIENT
Start: 2022-11-02

## 2022-11-02 NOTE — TELEPHONE ENCOUNTER
----- Message from Nils Peng sent at 10/31/2022 12:29 PM EDT -----  Subject: Message to Provider    QUESTIONS  Information for Provider? Fariha Mathew with [employer] called for the   balance for the date of service 05/11/2022. Previous faxes have not been   responded to. Please call [phone] with that information.  ---------------------------------------------------------------------------  --------------  Jayna Urrutia INFO  [phone]; OK to leave message on voicemail  ---------------------------------------------------------------------------  --------------  SCRIPT ANSWERS  Relationship to Patient? Third Party  Third Party Type? ? Representative Name?  Fariha Mathew

## 2023-01-12 ENCOUNTER — OFFICE VISIT (OUTPATIENT)
Dept: FAMILY MEDICINE CLINIC | Age: 38
End: 2023-01-12
Payer: COMMERCIAL

## 2023-01-12 ENCOUNTER — NURSE TRIAGE (OUTPATIENT)
Dept: OTHER | Facility: CLINIC | Age: 38
End: 2023-01-12

## 2023-01-12 VITALS
TEMPERATURE: 97.8 F | HEART RATE: 95 BPM | RESPIRATION RATE: 17 BRPM | HEIGHT: 62 IN | OXYGEN SATURATION: 96 % | SYSTOLIC BLOOD PRESSURE: 139 MMHG | WEIGHT: 241 LBS | DIASTOLIC BLOOD PRESSURE: 88 MMHG | BODY MASS INDEX: 44.35 KG/M2

## 2023-01-12 DIAGNOSIS — N64.89 HEMATOMA OF RIGHT BREAST: Primary | ICD-10-CM
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
## 2023-01-12 NOTE — PROGRESS NOTES
Chief Complaint   Patient presents with    Breast pain     right     1. Have you been to the ER, urgent care clinic since your last visit? Hospitalized since your last visit? No    2. Have you seen or consulted any other health care providers outside of the [address] [place] [address] since your last visit? Include any pap smears or colon screening.  No

## 2023-01-17 ENCOUNTER — OFFICE VISIT (OUTPATIENT)
Dept: SURGERY | Age: 38
End: 2023-01-17
Payer: COMMERCIAL

## 2023-01-17 VITALS
BODY MASS INDEX: 36.8 KG/M2 | SYSTOLIC BLOOD PRESSURE: 154 MMHG | WEIGHT: 200 LBS | HEIGHT: 62 IN | HEART RATE: 90 BPM | DIASTOLIC BLOOD PRESSURE: 90 MMHG

## 2023-01-17 DIAGNOSIS — N64.89 HEMATOMA OF RIGHT BREAST: Primary | ICD-10-CM

## 2023-01-17 PROCEDURE — 76642 ULTRASOUND BREAST LIMITED: CPT | Performed by: SURGERY

## 2023-01-17 PROCEDURE — 99202 OFFICE O/P NEW SF 15 MIN: CPT | Performed by: SURGERY
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
## 2023-01-17 NOTE — LETTER
1/17/2023    Patient: Theodore Yen   YOB: 1985   Date of Visit: 1/17/2023     Yrn Davis MD  [address]  [address] [facility] [address]    Dear Yrn Davis MD,      Thank you for referring Ms. Elio Martínez to [address] for evaluation. My notes for this consultation are attached. If you have questions, please do not hesitate to call me. I look forward to following your patient along with you.       Sincerely,    Maribeth Boston MD

## 2023-01-23 NOTE — TELEPHONE ENCOUNTER
----- Message from Marva Renteria sent at 7/3/2018 10:56 AM EDT -----  Regarding: Dr. Tye Verma  Pt is requesting a call back regarding counseling she was recommended.     Best contact number [phone]
hand pain/injury

## 2023-02-07 ENCOUNTER — HOSPITAL ENCOUNTER (OUTPATIENT)
Dept: GENERAL RADIOLOGY | Age: 38
Discharge: HOME | End: 2023-02-07
Payer: COMMERCIAL

## 2023-02-07 ENCOUNTER — TRANSCRIBE ORDER (OUTPATIENT)
Dept: REGISTRATION | Age: 38
End: 2023-02-07

## 2023-02-07 DIAGNOSIS — M54.17 LUMBOSACRAL RADICULOPATHY: Primary | ICD-10-CM

## 2023-02-07 DIAGNOSIS — M54.17 LUMBOSACRAL RADICULOPATHY: ICD-10-CM

## 2023-02-07 PROCEDURE — 72110 X-RAY EXAM L-2 SPINE 4/>VWS: CPT

## 2023-03-24 DIAGNOSIS — Z76.0 ENCOUNTER FOR MEDICATION REFILL: ICD-10-CM

## 2023-03-24 DIAGNOSIS — E11.9 TYPE 2 DIABETES MELLITUS WITHOUT COMPLICATION, WITHOUT LONG-TERM CURRENT USE OF INSULIN (HCC): ICD-10-CM

## 2023-03-24 DIAGNOSIS — F41.1 GAD (GENERALIZED ANXIETY DISORDER): ICD-10-CM

## 2023-03-29 RX ORDER — BLOOD SUGAR DIAGNOSTIC
STRIP MISCELLANEOUS
Qty: 100 STRIP | Refills: 1 | Status: SHIPPED | OUTPATIENT
Start: 2023-03-29

## 2023-03-29 RX ORDER — ARIPIPRAZOLE 20 MG/1
TABLET ORAL
Qty: 30 TABLET | Refills: 2 | Status: SHIPPED | OUTPATIENT
Start: 2023-03-29

## 2023-04-23 DIAGNOSIS — R20.2 PARESTHESIA OF SKIN: Primary | ICD-10-CM

## 2023-05-10 DIAGNOSIS — Z76.0 ENCOUNTER FOR ISSUE OF REPEAT PRESCRIPTION: ICD-10-CM

## 2023-05-10 DIAGNOSIS — F41.1 GENERALIZED ANXIETY DISORDER: ICD-10-CM

## 2023-05-11 RX ORDER — TRAZODONE HYDROCHLORIDE 150 MG/1
TABLET ORAL NIGHTLY
Qty: 90 TABLET | Refills: 1 | Status: SHIPPED | OUTPATIENT
Start: 2023-05-11

## 2023-07-09 DIAGNOSIS — E11.9 TYPE 2 DIABETES MELLITUS WITHOUT COMPLICATIONS (HCC): ICD-10-CM

## 2023-07-09 DIAGNOSIS — Z76.0 ENCOUNTER FOR ISSUE OF REPEAT PRESCRIPTION: ICD-10-CM

## 2023-07-11 RX ORDER — BLOOD SUGAR DIAGNOSTIC
STRIP MISCELLANEOUS
Qty: 100 STRIP | Refills: 1 | Status: SHIPPED | OUTPATIENT
Start: 2023-07-11

## 2023-08-02 DIAGNOSIS — Z76.0 ENCOUNTER FOR ISSUE OF REPEAT PRESCRIPTION: ICD-10-CM

## 2023-08-02 DIAGNOSIS — F41.1 GENERALIZED ANXIETY DISORDER: ICD-10-CM

## 2023-08-02 DIAGNOSIS — E11.9 TYPE 2 DIABETES MELLITUS WITHOUT COMPLICATIONS (HCC): ICD-10-CM

## 2023-08-03 RX ORDER — ARIPIPRAZOLE 20 MG/1
TABLET ORAL
Qty: 30 TABLET | Refills: 0 | Status: SHIPPED | OUTPATIENT
Start: 2023-08-03 | End: 2023-09-08 | Stop reason: SDUPTHER

## 2023-09-06 DIAGNOSIS — Z76.0 ENCOUNTER FOR ISSUE OF REPEAT PRESCRIPTION: ICD-10-CM

## 2023-09-06 DIAGNOSIS — F41.1 GENERALIZED ANXIETY DISORDER: ICD-10-CM

## 2023-09-06 DIAGNOSIS — E11.9 TYPE 2 DIABETES MELLITUS WITHOUT COMPLICATIONS (HCC): ICD-10-CM

## 2023-09-08 DIAGNOSIS — Z76.0 ENCOUNTER FOR ISSUE OF REPEAT PRESCRIPTION: ICD-10-CM

## 2023-09-08 DIAGNOSIS — F41.1 GENERALIZED ANXIETY DISORDER: ICD-10-CM

## 2023-09-08 RX ORDER — ARIPIPRAZOLE 20 MG/1
20 TABLET ORAL DAILY
Qty: 30 TABLET | Refills: 0 | Status: SHIPPED | OUTPATIENT
Start: 2023-09-08

## 2023-09-08 RX ORDER — ARIPIPRAZOLE 20 MG/1
TABLET ORAL
Qty: 14 TABLET | Refills: 0 | OUTPATIENT
Start: 2023-09-08

## 2023-09-13 NOTE — PROGRESS NOTES
Vanessa Acevedo  42 y.o. female  1985  [address] [place] [id number]  [id number]   [address]:    Telemedicine Progress Note  Arin Pisano MD       Encounter Date and Time: September 14, 2023 at 4:33 PM    Vanessa Acevedo, was evaluated through a synchronous (real-time) audio-video encounter. The patient (or guardian if applicable) is aware that this is a billable service, which includes applicable co-pays. This Virtual Visit was conducted with patient's (and/or legal guardian's) consent. Patient identification was verified, and a caregiver was present when appropriate. The patient was located at Home: [address] [place] [id number]  Provider was located at Facility ([address] [place] [address]): [address]      Chief Complaint   Patient presents with    Depression     History of Present Illness   Vanessa Acevedo is a 42 y.o. female was evaluated by synchronous (real-time) audio-video technology from home, through a secure patient portal.    #Depression:   - Was on Aripiprazole 20 mg QD. Has been on medication for years. - Desires to stay on current regimen   - PHQ-9: 4.   - No SI/HI     #Type 2 DM:  - Metformin 1000 mg BID   - BG range at home: 140s. - Last A1c in chart 5.9 on 5/23/22   - No low glucose levels     #Insomnia:   - Trazodone 150 mg QHS  - OTC does not work. - Has been taking for years      - Needs labs. Patient will get labs at [facility]         Review of Systems   Review of Systems   Constitutional:  Negative for fatigue. Cardiovascular:  Negative for chest pain, palpitations and leg swelling. Gastrointestinal:  Negative for abdominal pain. Neurological:  Negative for dizziness. Psychiatric/Behavioral:  Negative for agitation, confusion and suicidal ideas. The patient is not nervous/anxious.           Vitals/Objective:     General: alert, cooperative, and no distress   Mental  status: mental status:

## 2023-09-14 ENCOUNTER — TELEMEDICINE (OUTPATIENT)
Age: 38
End: 2023-09-14
Payer: COMMERCIAL

## 2023-09-14 DIAGNOSIS — F32.A DEPRESSION, UNSPECIFIED DEPRESSION TYPE: ICD-10-CM

## 2023-09-14 DIAGNOSIS — G47.00 INSOMNIA, UNSPECIFIED TYPE: ICD-10-CM

## 2023-09-14 DIAGNOSIS — E11.9 TYPE 2 DIABETES MELLITUS WITHOUT COMPLICATION, WITHOUT LONG-TERM CURRENT USE OF INSULIN (HCC): Primary | ICD-10-CM

## 2023-09-14 DIAGNOSIS — E11.9 TYPE 2 DIABETES MELLITUS WITHOUT COMPLICATION, WITHOUT LONG-TERM CURRENT USE OF INSULIN (HCC): ICD-10-CM

## 2023-09-14 DIAGNOSIS — Z13.220 SCREENING FOR LIPID DISORDERS: ICD-10-CM

## 2023-09-14 DIAGNOSIS — F41.1 GENERALIZED ANXIETY DISORDER: ICD-10-CM

## 2023-09-14 DIAGNOSIS — Z76.0 ENCOUNTER FOR ISSUE OF REPEAT PRESCRIPTION: ICD-10-CM

## 2023-09-14 PROCEDURE — 99213 OFFICE O/P EST LOW 20 MIN: CPT

## 2023-09-14 RX ORDER — TRAZODONE HYDROCHLORIDE 150 MG/1
150 TABLET ORAL NIGHTLY
Qty: 90 TABLET | Refills: 3 | Status: SHIPPED | OUTPATIENT
Start: 2023-09-14

## 2023-09-14 RX ORDER — ARIPIPRAZOLE 20 MG/1
20 TABLET ORAL DAILY
Qty: 90 TABLET | Refills: 0 | Status: SHIPPED | OUTPATIENT
Start: 2023-09-14

## 2023-09-14 ASSESSMENT — ENCOUNTER SYMPTOMS: ABDOMINAL PAIN: 0

## 2023-09-14 NOTE — PROGRESS NOTES
[address] [place] [address] Medicine Residency Attending Addendum:  Dr. Digna West MD,  the patient and I were not physically present during this encounter. The resident and I are concurrently monitoring the patient care through appropriate telecommunication technology. I discussed the findings, assessment and plan with the resident and agree with the resident's findings and plan as documented in the resident's note.       Matthew Hernandez MD

## 2024-01-15 DIAGNOSIS — F32.A DEPRESSION, UNSPECIFIED DEPRESSION TYPE: ICD-10-CM

## 2024-01-18 RX ORDER — ARIPIPRAZOLE 20 MG/1
20 TABLET ORAL DAILY
Qty: 90 TABLET | Refills: 0 | Status: SHIPPED | OUTPATIENT
Start: 2024-01-18